# Patient Record
Sex: FEMALE | Race: OTHER | HISPANIC OR LATINO
[De-identification: names, ages, dates, MRNs, and addresses within clinical notes are randomized per-mention and may not be internally consistent; named-entity substitution may affect disease eponyms.]

---

## 2018-10-25 PROBLEM — Z00.00 ENCOUNTER FOR PREVENTIVE HEALTH EXAMINATION: Status: ACTIVE | Noted: 2018-10-25

## 2018-11-12 ENCOUNTER — NON-APPOINTMENT (OUTPATIENT)
Age: 78
End: 2018-11-12

## 2018-11-12 ENCOUNTER — APPOINTMENT (OUTPATIENT)
Dept: CARDIOLOGY | Facility: CLINIC | Age: 78
End: 2018-11-12
Payer: MEDICARE

## 2018-11-12 VITALS
WEIGHT: 150 LBS | HEIGHT: 60 IN | DIASTOLIC BLOOD PRESSURE: 81 MMHG | OXYGEN SATURATION: 100 % | BODY MASS INDEX: 29.45 KG/M2 | SYSTOLIC BLOOD PRESSURE: 173 MMHG | HEART RATE: 89 BPM

## 2018-11-12 DIAGNOSIS — R06.09 OTHER FORMS OF DYSPNEA: ICD-10-CM

## 2018-11-12 DIAGNOSIS — R94.31 ABNORMAL ELECTROCARDIOGRAM [ECG] [EKG]: ICD-10-CM

## 2018-11-12 DIAGNOSIS — I10 ESSENTIAL (PRIMARY) HYPERTENSION: ICD-10-CM

## 2018-11-12 PROCEDURE — 99204 OFFICE O/P NEW MOD 45 MIN: CPT

## 2018-11-12 PROCEDURE — 93000 ELECTROCARDIOGRAM COMPLETE: CPT

## 2018-11-12 NOTE — PHYSICAL EXAM
[General Appearance - Well Developed] : well developed [Normal Appearance] : normal appearance [Well Groomed] : well groomed [General Appearance - Well Nourished] : well nourished [No Deformities] : no deformities [General Appearance - In No Acute Distress] : no acute distress [Normal Conjunctiva] : the conjunctiva exhibited no abnormalities [Eyelids - No Xanthelasma] : the eyelids demonstrated no xanthelasmas [Normal Oral Mucosa] : normal oral mucosa [No Oral Pallor] : no oral pallor [No Oral Cyanosis] : no oral cyanosis [Normal Jugular Venous A Waves Present] : normal jugular venous A waves present [Normal Jugular Venous V Waves Present] : normal jugular venous V waves present [No Jugular Venous Ponce A Waves] : no jugular venous ponce A waves [Heart Rate And Rhythm] : heart rate and rhythm were normal [Heart Sounds] : normal S1 and S2 [Murmurs] : no murmurs present [FreeTextEntry1] : possible S3 [Respiration, Rhythm And Depth] : normal respiratory rhythm and effort [Exaggerated Use Of Accessory Muscles For Inspiration] : no accessory muscle use [Auscultation Breath Sounds / Voice Sounds] : lungs were clear to auscultation bilaterally [Abdomen Soft] : soft [Abdomen Tenderness] : non-tender [Abdomen Mass (___ Cm)] : no abdominal mass palpated [Abnormal Walk] : normal gait [Gait - Sufficient For Exercise Testing] : the gait was sufficient for exercise testing [Nail Clubbing] : no clubbing of the fingernails [Cyanosis, Localized] : no localized cyanosis [Petechial Hemorrhages (___cm)] : no petechial hemorrhages [Skin Color & Pigmentation] : normal skin color and pigmentation [] : no rash [No Venous Stasis] : no venous stasis [Skin Lesions] : no skin lesions [No Skin Ulcers] : no skin ulcer [No Xanthoma] : no  xanthoma was observed [Oriented To Time, Place, And Person] : oriented to person, place, and time [Affect] : the affect was normal [Mood] : the mood was normal [No Anxiety] : not feeling anxious

## 2018-11-12 NOTE — HISTORY OF PRESENT ILLNESS
[FreeTextEntry1] : 78 year old woman with hypertension and hyperlipidemia presents with an abnormal EKG.  Recently frequent PVC's were found.  On Holter up to 10% were PVC's.  She has dyspnea on exertion.  She denies chest pain or syncope.  She has difficultly ambulating due to left knee pain.

## 2018-11-12 NOTE — DISCUSSION/SUMMARY
[FreeTextEntry1] : 78 year old woman with very frequent PVC's.  She has dyspnea on exertion and risk factors for CAD.  Will echo and perform a nuclear pharmacologic stress to rule out an ischemic etiology.

## 2018-11-12 NOTE — REASON FOR VISIT
[Abnormal ECG] : an abnormal ECG [Dyspnea] : dyspnea [Hyperlipidemia] : hyperlipidemia [Hypertension] : hypertension

## 2019-09-21 ENCOUNTER — INPATIENT (INPATIENT)
Facility: HOSPITAL | Age: 79
LOS: 4 days | Discharge: HOME | DRG: 437 | End: 2019-09-26
Attending: FAMILY MEDICINE | Admitting: FAMILY MEDICINE
Payer: SELF-PAY

## 2019-09-21 VITALS
RESPIRATION RATE: 18 BRPM | HEART RATE: 99 BPM | SYSTOLIC BLOOD PRESSURE: 117 MMHG | HEIGHT: 60 IN | DIASTOLIC BLOOD PRESSURE: 58 MMHG | OXYGEN SATURATION: 100 % | TEMPERATURE: 98 F | WEIGHT: 138.89 LBS

## 2019-09-21 DIAGNOSIS — R16.0 HEPATOMEGALY, NOT ELSEWHERE CLASSIFIED: ICD-10-CM

## 2019-09-21 LAB
ALBUMIN SERPL ELPH-MCNC: 2.8 G/DL — LOW (ref 3.3–5)
ALP SERPL-CCNC: 280 U/L — HIGH (ref 40–120)
ALT FLD-CCNC: 34 U/L — SIGNIFICANT CHANGE UP (ref 12–78)
ANION GAP SERPL CALC-SCNC: 8 MMOL/L — SIGNIFICANT CHANGE UP (ref 5–17)
APTT BLD: 33.1 SEC — SIGNIFICANT CHANGE UP (ref 27.5–36.3)
AST SERPL-CCNC: 51 U/L — HIGH (ref 15–37)
BASOPHILS # BLD AUTO: 0 K/UL — SIGNIFICANT CHANGE UP (ref 0–0.2)
BASOPHILS NFR BLD AUTO: 0 % — SIGNIFICANT CHANGE UP (ref 0–2)
BILIRUB SERPL-MCNC: 0.5 MG/DL — SIGNIFICANT CHANGE UP (ref 0.2–1.2)
BUN SERPL-MCNC: 22 MG/DL — SIGNIFICANT CHANGE UP (ref 7–23)
CALCIUM SERPL-MCNC: 9.2 MG/DL — SIGNIFICANT CHANGE UP (ref 8.5–10.1)
CHLORIDE SERPL-SCNC: 98 MMOL/L — SIGNIFICANT CHANGE UP (ref 96–108)
CO2 SERPL-SCNC: 26 MMOL/L — SIGNIFICANT CHANGE UP (ref 22–31)
CREAT SERPL-MCNC: 1.24 MG/DL — SIGNIFICANT CHANGE UP (ref 0.5–1.3)
EOSINOPHIL # BLD AUTO: 0 K/UL — SIGNIFICANT CHANGE UP (ref 0–0.5)
EOSINOPHIL NFR BLD AUTO: 0 % — SIGNIFICANT CHANGE UP (ref 0–6)
GLUCOSE SERPL-MCNC: 150 MG/DL — HIGH (ref 70–99)
HCT VFR BLD CALC: 33.2 % — LOW (ref 34.5–45)
HGB BLD-MCNC: 10.8 G/DL — LOW (ref 11.5–15.5)
INR BLD: 1.46 RATIO — HIGH (ref 0.88–1.16)
LACTATE SERPL-SCNC: 1.6 MMOL/L — SIGNIFICANT CHANGE UP (ref 0.7–2)
LIDOCAIN IGE QN: 168 U/L — SIGNIFICANT CHANGE UP (ref 73–393)
LYMPHOCYTES # BLD AUTO: 1.91 K/UL — SIGNIFICANT CHANGE UP (ref 1–3.3)
LYMPHOCYTES # BLD AUTO: 8 % — LOW (ref 13–44)
MCHC RBC-ENTMCNC: 28.8 PG — SIGNIFICANT CHANGE UP (ref 27–34)
MCHC RBC-ENTMCNC: 32.5 GM/DL — SIGNIFICANT CHANGE UP (ref 32–36)
MCV RBC AUTO: 88.5 FL — SIGNIFICANT CHANGE UP (ref 80–100)
MONOCYTES # BLD AUTO: 1.43 K/UL — HIGH (ref 0–0.9)
MONOCYTES NFR BLD AUTO: 6 % — SIGNIFICANT CHANGE UP (ref 2–14)
NEUTROPHILS # BLD AUTO: 20.25 K/UL — HIGH (ref 1.8–7.4)
NEUTROPHILS NFR BLD AUTO: 85 % — HIGH (ref 43–77)
NRBC # BLD: SIGNIFICANT CHANGE UP /100 WBCS (ref 0–0)
PLATELET # BLD AUTO: 375 K/UL — SIGNIFICANT CHANGE UP (ref 150–400)
POTASSIUM SERPL-MCNC: 4 MMOL/L — SIGNIFICANT CHANGE UP (ref 3.5–5.3)
POTASSIUM SERPL-SCNC: 4 MMOL/L — SIGNIFICANT CHANGE UP (ref 3.5–5.3)
PROT SERPL-MCNC: 8 GM/DL — SIGNIFICANT CHANGE UP (ref 6–8.3)
PROTHROM AB SERPL-ACNC: 16.4 SEC — HIGH (ref 10–12.9)
RBC # BLD: 3.75 M/UL — LOW (ref 3.8–5.2)
RBC # FLD: 13.1 % — SIGNIFICANT CHANGE UP (ref 10.3–14.5)
SODIUM SERPL-SCNC: 132 MMOL/L — LOW (ref 135–145)
WBC # BLD: 23.82 K/UL — HIGH (ref 3.8–10.5)
WBC # FLD AUTO: 23.82 K/UL — HIGH (ref 3.8–10.5)

## 2019-09-21 PROCEDURE — 71260 CT THORAX DX C+: CPT

## 2019-09-21 PROCEDURE — 83605 ASSAY OF LACTIC ACID: CPT

## 2019-09-21 PROCEDURE — 36561 INSERT TUNNELED CV CATH: CPT

## 2019-09-21 PROCEDURE — 36415 COLL VENOUS BLD VENIPUNCTURE: CPT

## 2019-09-21 PROCEDURE — 88342 IMHCHEM/IMCYTCHM 1ST ANTB: CPT

## 2019-09-21 PROCEDURE — C1889: CPT

## 2019-09-21 PROCEDURE — 80053 COMPREHEN METABOLIC PANEL: CPT

## 2019-09-21 PROCEDURE — 88341 IMHCHEM/IMCYTCHM EA ADD ANTB: CPT

## 2019-09-21 PROCEDURE — 76942 ECHO GUIDE FOR BIOPSY: CPT

## 2019-09-21 PROCEDURE — 74183 MRI ABD W/O CNTR FLWD CNTR: CPT

## 2019-09-21 PROCEDURE — 99223 1ST HOSP IP/OBS HIGH 75: CPT

## 2019-09-21 PROCEDURE — 82378 CARCINOEMBRYONIC ANTIGEN: CPT

## 2019-09-21 PROCEDURE — 87040 BLOOD CULTURE FOR BACTERIA: CPT

## 2019-09-21 PROCEDURE — 82105 ALPHA-FETOPROTEIN SERUM: CPT

## 2019-09-21 PROCEDURE — C9113: CPT

## 2019-09-21 PROCEDURE — 88172 CYTP DX EVAL FNA 1ST EA SITE: CPT

## 2019-09-21 PROCEDURE — C1769: CPT

## 2019-09-21 PROCEDURE — 80048 BASIC METABOLIC PNL TOTAL CA: CPT

## 2019-09-21 PROCEDURE — 76937 US GUIDE VASCULAR ACCESS: CPT

## 2019-09-21 PROCEDURE — 93010 ELECTROCARDIOGRAM REPORT: CPT

## 2019-09-21 PROCEDURE — 88333 PATH CONSLTJ SURG CYTO XM 1: CPT

## 2019-09-21 PROCEDURE — 86301 IMMUNOASSAY TUMOR CA 19-9: CPT

## 2019-09-21 PROCEDURE — 88307 TISSUE EXAM BY PATHOLOGIST: CPT

## 2019-09-21 PROCEDURE — 85025 COMPLETE CBC W/AUTO DIFF WBC: CPT

## 2019-09-21 PROCEDURE — 47000 NEEDLE BIOPSY OF LIVER PERQ: CPT

## 2019-09-21 PROCEDURE — 74177 CT ABD & PELVIS W/CONTRAST: CPT | Mod: 26

## 2019-09-21 PROCEDURE — A9579: CPT

## 2019-09-21 PROCEDURE — 77001 FLUOROGUIDE FOR VEIN DEVICE: CPT

## 2019-09-21 PROCEDURE — 71045 X-RAY EXAM CHEST 1 VIEW: CPT | Mod: 26

## 2019-09-21 PROCEDURE — C1894: CPT

## 2019-09-21 RX ORDER — HYDROMORPHONE HYDROCHLORIDE 2 MG/ML
0.2 INJECTION INTRAMUSCULAR; INTRAVENOUS; SUBCUTANEOUS EVERY 4 HOURS
Refills: 0 | Status: DISCONTINUED | OUTPATIENT
Start: 2019-09-21 | End: 2019-09-21

## 2019-09-21 RX ORDER — PANTOPRAZOLE SODIUM 20 MG/1
40 TABLET, DELAYED RELEASE ORAL DAILY
Refills: 0 | Status: DISCONTINUED | OUTPATIENT
Start: 2019-09-21 | End: 2019-09-26

## 2019-09-21 RX ORDER — SODIUM CHLORIDE 9 MG/ML
1000 INJECTION INTRAMUSCULAR; INTRAVENOUS; SUBCUTANEOUS ONCE
Refills: 0 | Status: COMPLETED | OUTPATIENT
Start: 2019-09-21 | End: 2019-09-21

## 2019-09-21 RX ORDER — ENOXAPARIN SODIUM 100 MG/ML
40 INJECTION SUBCUTANEOUS DAILY
Refills: 0 | Status: DISCONTINUED | OUTPATIENT
Start: 2019-09-21 | End: 2019-09-23

## 2019-09-21 RX ORDER — DONEPEZIL HYDROCHLORIDE 10 MG/1
5 TABLET, FILM COATED ORAL AT BEDTIME
Refills: 0 | Status: DISCONTINUED | OUTPATIENT
Start: 2019-09-21 | End: 2019-09-21

## 2019-09-21 RX ORDER — ONDANSETRON 8 MG/1
4 TABLET, FILM COATED ORAL EVERY 8 HOURS
Refills: 0 | Status: DISCONTINUED | OUTPATIENT
Start: 2019-09-21 | End: 2019-09-26

## 2019-09-21 RX ORDER — LISINOPRIL 2.5 MG/1
10 TABLET ORAL DAILY
Refills: 0 | Status: DISCONTINUED | OUTPATIENT
Start: 2019-09-21 | End: 2019-09-21

## 2019-09-21 RX ORDER — SODIUM CHLORIDE 9 MG/ML
1000 INJECTION, SOLUTION INTRAVENOUS
Refills: 0 | Status: DISCONTINUED | OUTPATIENT
Start: 2019-09-21 | End: 2019-09-22

## 2019-09-21 RX ORDER — PIPERACILLIN AND TAZOBACTAM 4; .5 G/20ML; G/20ML
3.38 INJECTION, POWDER, LYOPHILIZED, FOR SOLUTION INTRAVENOUS EVERY 8 HOURS
Refills: 0 | Status: DISCONTINUED | OUTPATIENT
Start: 2019-09-21 | End: 2019-09-25

## 2019-09-21 RX ORDER — HYDROMORPHONE HYDROCHLORIDE 2 MG/ML
0.5 INJECTION INTRAMUSCULAR; INTRAVENOUS; SUBCUTANEOUS EVERY 6 HOURS
Refills: 0 | Status: DISCONTINUED | OUTPATIENT
Start: 2019-09-21 | End: 2019-09-22

## 2019-09-21 RX ADMIN — HYDROMORPHONE HYDROCHLORIDE 0.5 MILLIGRAM(S): 2 INJECTION INTRAMUSCULAR; INTRAVENOUS; SUBCUTANEOUS at 15:43

## 2019-09-21 RX ADMIN — PIPERACILLIN AND TAZOBACTAM 25 GRAM(S): 4; .5 INJECTION, POWDER, LYOPHILIZED, FOR SOLUTION INTRAVENOUS at 21:56

## 2019-09-21 RX ADMIN — SODIUM CHLORIDE 1000 MILLILITER(S): 9 INJECTION INTRAMUSCULAR; INTRAVENOUS; SUBCUTANEOUS at 12:11

## 2019-09-21 RX ADMIN — HYDROMORPHONE HYDROCHLORIDE 0.5 MILLIGRAM(S): 2 INJECTION INTRAMUSCULAR; INTRAVENOUS; SUBCUTANEOUS at 15:14

## 2019-09-21 RX ADMIN — SODIUM CHLORIDE 1000 MILLILITER(S): 9 INJECTION INTRAMUSCULAR; INTRAVENOUS; SUBCUTANEOUS at 13:44

## 2019-09-21 RX ADMIN — SODIUM CHLORIDE 70 MILLILITER(S): 9 INJECTION, SOLUTION INTRAVENOUS at 16:57

## 2019-09-21 RX ADMIN — ENOXAPARIN SODIUM 40 MILLIGRAM(S): 100 INJECTION SUBCUTANEOUS at 21:55

## 2019-09-21 RX ADMIN — SODIUM CHLORIDE 1000 MILLILITER(S): 9 INJECTION INTRAMUSCULAR; INTRAVENOUS; SUBCUTANEOUS at 14:44

## 2019-09-21 RX ADMIN — PIPERACILLIN AND TAZOBACTAM 25 GRAM(S): 4; .5 INJECTION, POWDER, LYOPHILIZED, FOR SOLUTION INTRAVENOUS at 15:24

## 2019-09-21 RX ADMIN — SODIUM CHLORIDE 1000 MILLILITER(S): 9 INJECTION INTRAMUSCULAR; INTRAVENOUS; SUBCUTANEOUS at 13:11

## 2019-09-21 NOTE — H&P ADULT - NSHPPHYSICALEXAM_GEN_ALL_CORE
PHYSICAL EXAM:    Daily Height in cm: 152.4 (21 Sep 2019 11:20)    Daily     ICU Vital Signs Last 24 Hrs  T(C): 36.4 (21 Sep 2019 13:43), Max: 36.9 (21 Sep 2019 11:20)  T(F): 97.6 (21 Sep 2019 13:43), Max: 98.4 (21 Sep 2019 11:20)  HR: 88 (21 Sep 2019 13:43) (88 - 99)  BP: 130/57 (21 Sep 2019 13:43) (117/58 - 130/57)  BP(mean): --  ABP: --  ABP(mean): --  RR: 16 (21 Sep 2019 13:43) (16 - 18)  SpO2: 98% (21 Sep 2019 13:43) (98% - 100%)      Constitutional: NAD  HEENT: Atraumatic, AARON, Normal, No congestion  Respiratory: Breath Sounds normal, no rhonchi/wheeze  Cardiovascular: N S1S2;   Gastrointestinal: Abdomen soft, epigastric tenedrness, no rebound  Extremities: No edema,   Neurological: AAO x 3, no gross focal motor deficits  Breasts: Deferred  Genitourinary: deferred  Rectal: Deferred

## 2019-09-21 NOTE — H&P ADULT - ASSESSMENT
78 y/o F HTN, chronic B/L knee pain, hyperlipidemia, vit D , gall bladder stone , hepatic cyst , small to moderate hemmorrhoids/diverticulosis/tortuous twisted colon/colonoscopy 10/2014, GERD/gastritis/polypoid mucosa. Polypoid lesion with central ulcer/HP gastritis resolved EGD 9/2014, post menopausal hysterectomy 1997, osteopenia, NAFLD,  Patient was sent to ED by her gastroenterologist Dr Yeung for evaluation of abdominal pain /epigastric region constant for 2 weeks .Described as burning .associated with nausea ,  loss of appetite and weight loss 10 lbs in past 2 weeks , bloating . 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 other primary source  # leukocytosis acute cholecystitis vs reactive from GB malignancy , Meets SIRS criteria  #hx NAFL  #hx gastritis/polyp with central ulcer  # hx GB stone, hepatic cyst/diverticulosis  # hx HTN/HLD    Plan  IV zosyn  IV pantacid 40 daily  Keep NPO  IVF  Blood cx, serum lactate  ID consult, GI consult, surgery consult  pt received IVF in ED as per sepsis protocol 30cc/kg   Lovenox SC 40daily for DVT prophylaxis   pt is full code   advanced care planning time spent 20 mins

## 2019-09-21 NOTE — ED STATDOCS - OBJECTIVE STATEMENT
78 y/o F with PMHx of HTN, HLD, Gallstones, Hemorrhoids diverticulosis presenting to the ED c/o abd x 2 weeks. +nausea Patient reports that the pain has been constant for the past 2 weeks, and was seen by doctor today and told her to come to the ER for further evaluation. Patient has US done, and has pancreatic mass in her stomach.  Denies any D/V, fever or chills. Patient has no further complaints at this time.

## 2019-09-21 NOTE — CONSULT NOTE ADULT - SUBJECTIVE AND OBJECTIVE BOX
Surgery called to evaluate this 80 y/o F HTN, chronic B/L knee pain, hyperlipidemia, vit D , gall bladder stone , hepatic cyst , small to moderate hemmorrhoids/diverticulosis/tortuou, GERD/gastritis/polypoid mucosa. Polypoid lesion with central ulcer/HP gastritis resolved EGD 2014, post menopausal hysterectomy , osteopenia, NAFLD,    Patient was sent to ED by her gastroenterologist Dr Yeung for evaluation of abdominal pain /epigastric region constant for 2 weeks .Described as burning .associated with nausea ,  loss of appetite and weight loss 10 lbs in past 2 weeks , bloating . 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 Polypoid lesion with central ulcer/HP gastritis resolved EGD 2014, post menopausal hysterectomy and oophorectomy  , osteopenia, NAFLD,    family hx- mother  of heart attack and stroke   no family hx of colon CA and gastric  CA    social hx - denies etoh use, non smoker, denies recreational drug use       ICU Vital Signs Last 24 Hrs  T(C): 36.8 (21 Sep 2019 16:40), Max: 37 (21 Sep 2019 15:24)  T(F): 98.2 (21 Sep 2019 16:40), Max: 98.6 (21 Sep 2019 15:24)  HR: 94 (21 Sep 2019 16:40) (88 - 99)  BP: 129/45 (21 Sep 2019 16:40) (117/58 - 131/61)  BP(mean): --  ABP: --  ABP(mean): --  RR: 17 (21 Sep 2019 16:40) (15 - 18)  SpO2: 97% (21 Sep 2019 16:40) (96% - 100%)        gen aaox3 nad  cardiac s1 s2 rr  lungs clear  abd soft mass palpable in RUQ, non tender, no signs of peritonitis  ext no edema b/l                          10.8   23.82 )-----------( 375      ( 21 Sep 2019 12:07 )             33.2       132<L>  |  98  |  22  ----------------------------<  150<H>  4.0   |  26  |  1.24    Ca    9.2      21 Sep 2019 12:07    TPro  8.0  /  Alb  2.8<L>  /  TBili  0.5  /  DBili  x   /  AST  51<H>  /  ALT  34  /  AlkPhos  280<H>      < from: CT Abdomen and Pelvis w/ IV Cont (19 @ 13:22) >    EXAM:  CT ABDOMEN AND PELVIS IC                            PROCEDURE DATE:  2019          INTERPRETATION:  CLINICAL INFORMATION: Abdominal pain, masses    COMPARISON: None.    PROCEDURE:   CT of the Abdomen and Pelvis was performed with intravenous contrast.   Intravenous contrast: 90 ml Omnipaque 350. 10 ml discarded.  Oral contrast: None.  Sagittal and coronal reformats were performed.    FINDINGS:    LOWER CHEST: Within normal limits.    LIVER: The large mass involving the right lobe of the liver primarily   segment 5. This measures at at these 7 x 8 x 11 cm. The adjacent   gallbladder is abnormal in appearance. Findings may represent gallbladder   malignancy with extension into the liver, however other sources of   malignancy are not excluded. Numerous small satellite lesions are also   seen throughout the liver.  BILE DUCTS: Normal caliber.  GALLBLADDER: Abnormal, see above under liver.  SPLEEN: Within normal limits.  PANCREAS: Pancreatic head is obscured. The body and tail are unremarkable.  ADRENALS: Within normal limits.  KIDNEYS/URETERS: Subcentimeter right cortical hypodensity too small to   characterize. The kidneys are otherwise unremarkable    BLADDER: Within normal limits.  REPRODUCTIVE ORGANS: Hysterectomy.    BOWEL: Scattered colonic diverticulosis. No bowel obstruction. Appendix   normal  PERITONEUM: Moderate amount of simple free fluid in the pelvis.  VESSELS: Within normal limits.  RETROPERITONEUM/LYMPH NODES: 4 x 5 cm mass at 4-5. This likely represents  a large lymph node. Other enlarged periportal nodes are present.    ABDOMINAL WALL: Within normal limits.  BONES: Within normal limits.    IMPRESSION:     Large mass involving the right lobe of the liver primarily segment 5.   This measures at  least 7 x 8 x 11 cm. The adjacent gallbladder is   abnormal in appearance. Findings may represent gallbladder malignancy   with extension into the liver, however other sources of malignancy are   not excluded. Numerous small satellite lesions are also seen throughout   the liver. Large periportal adenopathy.                    LUIS ALBERTO M.D., ATTENDING RADIOLOGIST  This document has been electronically signed. Sep 21 201    < end of copied text >

## 2019-09-21 NOTE — H&P ADULT - NSHPLABSRESULTS_GEN_ALL_CORE
10.8   23.82 )-----------( 375      ( 21 Sep 2019 12:07 )             33.2       CBC Full  -  ( 21 Sep 2019 12:07 )  WBC Count : 23.82 K/uL  RBC Count : 3.75 M/uL  Hemoglobin : 10.8 g/dL  Hematocrit : 33.2 %  Platelet Count - Automated : 375 K/uL  Mean Cell Volume : 88.5 fl  Mean Cell Hemoglobin : 28.8 pg  Mean Cell Hemoglobin Concentration : 32.5 gm/dL  Auto Neutrophil # : 20.25 K/uL  Auto Lymphocyte # : 1.91 K/uL  Auto Monocyte # : 1.43 K/uL  Auto Eosinophil # : 0.00 K/uL  Auto Basophil # : 0.00 K/uL  Auto Neutrophil % : 85.0 %  Auto Lymphocyte % : 8.0 %  Auto Monocyte % : 6.0 %  Auto Eosinophil % : 0.0 %  Auto Basophil % : 0.0 %      09-21    132<L>  |  98  |  22  ----------------------------<  150<H>  4.0   |  26  |  1.24    Ca    9.2      21 Sep 2019 12:07    TPro  8.0  /  Alb  2.8<L>  /  TBili  0.5  /  DBili  x   /  AST  51<H>  /  ALT  34  /  AlkPhos  280<H>  09-21      LIVER FUNCTIONS - ( 21 Sep 2019 12:07 )  Alb: 2.8 g/dL / Pro: 8.0 gm/dL / ALK PHOS: 280 U/L / ALT: 34 U/L / AST: 51 U/L / GGT: x             PT/INR - ( 21 Sep 2019 12:07 )   PT: 16.4 sec;   INR: 1.46 ratio         PTT - ( 21 Sep 2019 12:07 )  PTT:33.1 sec                  MEDICATIONS  (STANDING):  dextrose 5% + sodium chloride 0.9%. 1000 milliLiter(s) (70 mL/Hr) IV Continuous <Continuous>  piperacillin/tazobactam IVPB.. 3.375 Gram(s) IV Intermittent every 8 hours

## 2019-09-21 NOTE — CONSULT NOTE ADULT - ASSESSMENT
79F with large liver lesion on CT scan with unknown primary      -tumor markers CA 19-9, CEA, AFP  -NPO  -IVF   -will need bx of liver lesion this week  -pain control  -GI/Oncology follow up    d/w Dr Shah 79F with large liver lesion on CT scan with unknown primary      -tumor markers CA 19-9, CEA,  -will need CT CHEST FOR STAGING  -NPO  -IVF   -will need bx of liver lesion this week  -pain control  -GI/Oncology follow up    d/w Dr Shah

## 2019-09-21 NOTE — ED STATDOCS - PROGRESS NOTE DETAILS
Opal NAVARRO for ED attending, Dr. Camarena: 80 y/o F with PMHx of HTN, HLD, Gallstones, Hemorrhoids diverticulosis presenting to the ED c/o abd x 2 weeks. +nausea Patient reports that the pain has been constant for the past 2 weeks, and was seen by doctor today and told her to come to the ER for further evaluation.  Denies any D/V, fever or chills. Patient has no further complaints at this time. Patient will be sent to main for further evaluation. Pt. sent in by Dr. Yeung for pancreatic mass/Liver mass.  Pt. sent in by Dr. Yeung.  Kathryn Fitch PA-C Dr. Berger requested 30cc/kg secondary to leukocytosis.  IVF added.  Kathryn Fitch PA-C 78 y/o F with PMHx of HTN, HLD, Gallstones, Hemorrhoids diverticulosis presenting to the ED c/o abd x 2 weeks. +nausea Patient reports that the pain has been constant for the past 2 weeks, and was seen by doctor today and told her to come to the ER for further evaluation. Patient has US done, and has pancreatic mass in her stomach.  Denies any D/V, fever or chills. Patient has no further complaints at this time.  Kathryn Fitch PA-C

## 2019-09-21 NOTE — H&P ADULT - HISTORY OF PRESENT ILLNESS
80 y/o F HTN, chronic B/L knee pain, hyperlipidemia, vit D , gall bladder stone , hepatic cyst , small to moderate hemmorrhoids/diverticulosis/tortuous twisted colon/colonoscopy 10/2014, GERD/gastritis/polypoid mucosa. Polypoid lesion with central ulcer/HP gastritis resolved EGD 2014, post menopausal hysterectomy , osteopenia, NAFLD,  Patient was sent to ED by her gastroenterologist Dr Yeung for evaluation of abdominal pain /epigastric region constant for 2 weeks .Described as burning .associated with nausea ,  loss of appetite and weight loss 10 lbs in past 2 weeks , bloating . Has 1 Bowel movement every day with straining  Abd US done at dr yeung's office revealed pancreatic mass 4x3.8x3.8cm with retrolymphadenopathy , large liver mass 8x5.6cm suspected for liver or pancreatic malignancy     in ED WBC 23,000, HR 99, afebrile, alkphos 280, AST 51, lipase 186    Pmhx/Pshx- F HTN, chronic B/L knee pain, hyperlipidemia, vit D , gall bladder stone , hepatic cyst , small to moderate hemmorrhoids/diverticulosis/tortuous twisted colon/colonoscopy 10/2014, GERD/gastritis/polypoid mucosa. Polypoid lesion with central ulcer/HP gastritis resolved EGD 2014, post menopausal hysterectomy and oophorectomy  , osteopenia, NAFLD,    family hx- mother  of heart attack and stroke   no family hx of colon CA and gastric  CA    social hx 78 y/o F HTN, chronic B/L knee pain, hyperlipidemia, vit D , gall bladder stone , hepatic cyst , small to moderate hemmorrhoids/diverticulosis/tortuous twisted colon/colonoscopy 10/2014, GERD/gastritis/polypoid mucosa. Polypoid lesion with central ulcer/HP gastritis resolved EGD 2014, post menopausal hysterectomy , osteopenia, NAFLD,  Patient was sent to ED by her gastroenterologist Dr Yeung for evaluation of abdominal pain /epigastric region constant for 2 weeks .Described as burning .associated with nausea ,  loss of appetite and weight loss 10 lbs in past 2 weeks , bloating . 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 mets to liver , obscurred pancreatic head     Pmhx/Pshx- F HTN, chronic B/L knee pain, hyperlipidemia, vit D , gall bladder stone , hepatic cyst , small to moderate hemmorrhoids/diverticulosis/tortuous twisted colon/colonoscopy 10/2014, GERD/gastritis/polypoid mucosa. Polypoid lesion with central ulcer/HP gastritis resolved EGD 2014, post menopausal hysterectomy and oophorectomy  , osteopenia, NAFLD,    family hx- mother  of heart attack and stroke   no family hx of colon CA and gastric  CA    social hx - denies etoh use, non smoker, denies recreational drug use

## 2019-09-21 NOTE — PATIENT PROFILE ADULT - NSPROEDALEARNPREFOTH_GEN_A_NUR
computer/internet/skill demonstration/verbal instruction/video/individual instruction/written material

## 2019-09-21 NOTE — ED STATDOCS - ATTENDING CONTRIBUTION TO CARE
I, Amandeep Camarena, performed the initial face to face bedside interview with this patient regarding history of present illness, review of symptoms and relevant past medical, social and family history.  I completed an independent physical examination.  I was the initial provider who evaluated this patient. I have signed out the follow up of any pending tests (i.e. labs, radiological studies) to the ACP.  I have communicated the patient’s plan of care and disposition with the ACP.  The history, relevant review of systems, past medical and surgical history, medical decision making, and physical examination was documented by the scribe in my presence and I attest to the accuracy of the documentation.

## 2019-09-22 LAB
ALBUMIN SERPL ELPH-MCNC: 2.1 G/DL — LOW (ref 3.3–5)
ALP SERPL-CCNC: 253 U/L — HIGH (ref 40–120)
ALT FLD-CCNC: 27 U/L — SIGNIFICANT CHANGE UP (ref 12–78)
ANION GAP SERPL CALC-SCNC: 10 MMOL/L — SIGNIFICANT CHANGE UP (ref 5–17)
AST SERPL-CCNC: 26 U/L — SIGNIFICANT CHANGE UP (ref 15–37)
BASOPHILS # BLD AUTO: 0.04 K/UL — SIGNIFICANT CHANGE UP (ref 0–0.2)
BASOPHILS NFR BLD AUTO: 0.2 % — SIGNIFICANT CHANGE UP (ref 0–2)
BILIRUB SERPL-MCNC: 0.4 MG/DL — SIGNIFICANT CHANGE UP (ref 0.2–1.2)
BUN SERPL-MCNC: 12 MG/DL — SIGNIFICANT CHANGE UP (ref 7–23)
CALCIUM SERPL-MCNC: 8.5 MG/DL — SIGNIFICANT CHANGE UP (ref 8.5–10.1)
CHLORIDE SERPL-SCNC: 107 MMOL/L — SIGNIFICANT CHANGE UP (ref 96–108)
CO2 SERPL-SCNC: 22 MMOL/L — SIGNIFICANT CHANGE UP (ref 22–31)
CREAT SERPL-MCNC: 0.78 MG/DL — SIGNIFICANT CHANGE UP (ref 0.5–1.3)
EOSINOPHIL # BLD AUTO: 0.31 K/UL — SIGNIFICANT CHANGE UP (ref 0–0.5)
EOSINOPHIL NFR BLD AUTO: 1.6 % — SIGNIFICANT CHANGE UP (ref 0–6)
GLUCOSE SERPL-MCNC: 134 MG/DL — HIGH (ref 70–99)
HCT VFR BLD CALC: 28.4 % — LOW (ref 34.5–45)
HGB BLD-MCNC: 9.1 G/DL — LOW (ref 11.5–15.5)
IMM GRANULOCYTES NFR BLD AUTO: 0.6 % — SIGNIFICANT CHANGE UP (ref 0–1.5)
LYMPHOCYTES # BLD AUTO: 1.1 K/UL — SIGNIFICANT CHANGE UP (ref 1–3.3)
LYMPHOCYTES # BLD AUTO: 5.6 % — LOW (ref 13–44)
MCHC RBC-ENTMCNC: 28.6 PG — SIGNIFICANT CHANGE UP (ref 27–34)
MCHC RBC-ENTMCNC: 32 GM/DL — SIGNIFICANT CHANGE UP (ref 32–36)
MCV RBC AUTO: 89.3 FL — SIGNIFICANT CHANGE UP (ref 80–100)
MONOCYTES # BLD AUTO: 1.47 K/UL — HIGH (ref 0–0.9)
MONOCYTES NFR BLD AUTO: 7.4 % — SIGNIFICANT CHANGE UP (ref 2–14)
NEUTROPHILS # BLD AUTO: 16.72 K/UL — HIGH (ref 1.8–7.4)
NEUTROPHILS NFR BLD AUTO: 84.6 % — HIGH (ref 43–77)
PLATELET # BLD AUTO: 302 K/UL — SIGNIFICANT CHANGE UP (ref 150–400)
POTASSIUM SERPL-MCNC: 3.8 MMOL/L — SIGNIFICANT CHANGE UP (ref 3.5–5.3)
POTASSIUM SERPL-SCNC: 3.8 MMOL/L — SIGNIFICANT CHANGE UP (ref 3.5–5.3)
PROT SERPL-MCNC: 6.3 GM/DL — SIGNIFICANT CHANGE UP (ref 6–8.3)
RBC # BLD: 3.18 M/UL — LOW (ref 3.8–5.2)
RBC # FLD: 13.2 % — SIGNIFICANT CHANGE UP (ref 10.3–14.5)
SODIUM SERPL-SCNC: 139 MMOL/L — SIGNIFICANT CHANGE UP (ref 135–145)
WBC # BLD: 19.76 K/UL — HIGH (ref 3.8–10.5)
WBC # FLD AUTO: 19.76 K/UL — HIGH (ref 3.8–10.5)

## 2019-09-22 RX ORDER — SODIUM CHLORIDE 9 MG/ML
1000 INJECTION INTRAMUSCULAR; INTRAVENOUS; SUBCUTANEOUS
Refills: 0 | Status: DISCONTINUED | OUTPATIENT
Start: 2019-09-22 | End: 2019-09-25

## 2019-09-22 RX ORDER — OXYCODONE HYDROCHLORIDE 5 MG/1
5 TABLET ORAL EVERY 6 HOURS
Refills: 0 | Status: DISCONTINUED | OUTPATIENT
Start: 2019-09-22 | End: 2019-09-26

## 2019-09-22 RX ORDER — HYDROMORPHONE HYDROCHLORIDE 2 MG/ML
0.5 INJECTION INTRAMUSCULAR; INTRAVENOUS; SUBCUTANEOUS ONCE
Refills: 0 | Status: DISCONTINUED | OUTPATIENT
Start: 2019-09-22 | End: 2019-09-22

## 2019-09-22 RX ORDER — LISINOPRIL 2.5 MG/1
10 TABLET ORAL DAILY
Refills: 0 | Status: DISCONTINUED | OUTPATIENT
Start: 2019-09-22 | End: 2019-09-26

## 2019-09-22 RX ORDER — HYDROMORPHONE HYDROCHLORIDE 2 MG/ML
0.5 INJECTION INTRAMUSCULAR; INTRAVENOUS; SUBCUTANEOUS EVERY 4 HOURS
Refills: 0 | Status: DISCONTINUED | OUTPATIENT
Start: 2019-09-22 | End: 2019-09-26

## 2019-09-22 RX ADMIN — ENOXAPARIN SODIUM 40 MILLIGRAM(S): 100 INJECTION SUBCUTANEOUS at 13:54

## 2019-09-22 RX ADMIN — HYDROMORPHONE HYDROCHLORIDE 0.5 MILLIGRAM(S): 2 INJECTION INTRAMUSCULAR; INTRAVENOUS; SUBCUTANEOUS at 05:42

## 2019-09-22 RX ADMIN — PIPERACILLIN AND TAZOBACTAM 25 GRAM(S): 4; .5 INJECTION, POWDER, LYOPHILIZED, FOR SOLUTION INTRAVENOUS at 21:00

## 2019-09-22 RX ADMIN — OXYCODONE HYDROCHLORIDE 5 MILLIGRAM(S): 5 TABLET ORAL at 20:21

## 2019-09-22 RX ADMIN — HYDROMORPHONE HYDROCHLORIDE 0.5 MILLIGRAM(S): 2 INJECTION INTRAMUSCULAR; INTRAVENOUS; SUBCUTANEOUS at 11:03

## 2019-09-22 RX ADMIN — OXYCODONE HYDROCHLORIDE 5 MILLIGRAM(S): 5 TABLET ORAL at 21:30

## 2019-09-22 RX ADMIN — OXYCODONE HYDROCHLORIDE 5 MILLIGRAM(S): 5 TABLET ORAL at 16:57

## 2019-09-22 RX ADMIN — PANTOPRAZOLE SODIUM 40 MILLIGRAM(S): 20 TABLET, DELAYED RELEASE ORAL at 13:54

## 2019-09-22 RX ADMIN — HYDROMORPHONE HYDROCHLORIDE 0.5 MILLIGRAM(S): 2 INJECTION INTRAMUSCULAR; INTRAVENOUS; SUBCUTANEOUS at 13:55

## 2019-09-22 RX ADMIN — SODIUM CHLORIDE 50 MILLILITER(S): 9 INJECTION INTRAMUSCULAR; INTRAVENOUS; SUBCUTANEOUS at 13:53

## 2019-09-22 RX ADMIN — SODIUM CHLORIDE 70 MILLILITER(S): 9 INJECTION, SOLUTION INTRAVENOUS at 06:21

## 2019-09-22 RX ADMIN — PIPERACILLIN AND TAZOBACTAM 25 GRAM(S): 4; .5 INJECTION, POWDER, LYOPHILIZED, FOR SOLUTION INTRAVENOUS at 05:55

## 2019-09-22 RX ADMIN — OXYCODONE HYDROCHLORIDE 5 MILLIGRAM(S): 5 TABLET ORAL at 13:54

## 2019-09-22 RX ADMIN — PIPERACILLIN AND TAZOBACTAM 25 GRAM(S): 4; .5 INJECTION, POWDER, LYOPHILIZED, FOR SOLUTION INTRAVENOUS at 13:54

## 2019-09-22 NOTE — CONSULT NOTE ADULT - SUBJECTIVE AND OBJECTIVE BOX
Patient is a 79y old  Female who presents with a chief complaint of abdominal pain x2 weeks , nausea , sent to ED (22 Sep 2019 08:32)    HPI:  78 y/o F HTN, chronic B/L knee pain, hyperlipidemia, hepatic cyst , small to moderate hemmorrhoids/diverticulosis/tortuous twisted colon/colonoscopy 10/2014, GERD,  s/p  hysterectomy 1997, osteopenia, NAFLD, admitted on 9/21 for evaluation of 2 weeks history of right upper and mid quadrant abdominal pain, associated with nausea and bloating ;has had weight loss and outpatient imaging suspicious for large liver mass. Denies fever but did have some chills.           PMH: as above  PSH: as above  Meds: per reconciliation sheet, noted below  MEDICATIONS  (STANDING):  dextrose 5% + sodium chloride 0.9%. 1000 milliLiter(s) (70 mL/Hr) IV Continuous <Continuous>  enoxaparin Injectable 40 milliGRAM(s) SubCutaneous daily  pantoprazole  Injectable 40 milliGRAM(s) IV Push daily  piperacillin/tazobactam IVPB.. 3.375 Gram(s) IV Intermittent every 8 hours    MEDICATIONS  (PRN):  enalaprilat Injectable 0.625 milliGRAM(s) IV Push every 6 hours PRN for SBP>150  HYDROmorphone  Injectable 0.5 milliGRAM(s) IV Push every 6 hours PRN Moderate Pain (4 - 6)  ondansetron Injectable 4 milliGRAM(s) IV Push every 8 hours PRN Nausea and/or Vomiting    Allergies    No Known Allergies    Intolerances      Social: no smoking, no alcohol, no illegal drugs; no recent travel, no exposure to TB  FAMILY HISTORY:       ROS: the patient denies fever, no HA, no dizziness, no sore throat, no blurry vision, no CP, no palpitations, no SOB, no cough, no diarrhea, no dysuria, no leg pain, no claudication, no rash, no joint aches, no rectal pain or bleeding, no night sweats  All other systems reviewed and are negative    Vital Signs Last 24 Hrs  T(C): 36.9 (22 Sep 2019 05:30), Max: 37 (21 Sep 2019 15:24)  T(F): 98.4 (22 Sep 2019 05:30), Max: 98.6 (21 Sep 2019 15:24)  HR: 83 (22 Sep 2019 05:30) (83 - 99)  BP: 127/59 (22 Sep 2019 05:30) (117/58 - 131/61)  BP(mean): --  RR: 17 (22 Sep 2019 05:30) (15 - 18)  SpO2: 96% (22 Sep 2019 05:30) (96% - 100%)  Daily Height in cm: 154.94 (21 Sep 2019 16:40)    Daily     PE:    Constitutional: frail looking  HEENT: NC/AT, EOMI, PERRLA, conjunctivae clear; ears and nose atraumatic; pharynx clear  Neck: supple; thyroid not palpable  Back: no tenderness  Respiratory: respiratory effort normal; clear to auscultation  Cardiovascular: S1S2 regular, no murmurs  Abdomen: soft, right upper and mid quadrant tenderness; not distended, positive BS; no liver or spleen organomegaly  Genitourinary: no suprapubic tenderness  Musculoskeletal: no muscle tenderness, no joint swelling or tenderness  Neurological/ Psychiatric: AxOx3, judgement and insight normal;  moving all extremities  Skin: no rashes; no palpable lesions    Labs: all available labs reviewed                        9.1    19.76 )-----------( 302      ( 22 Sep 2019 07:06 )             28.4     09-22    139  |  107  |  12  ----------------------------<  134<H>  3.8   |  22  |  0.78    Ca    8.5      22 Sep 2019 07:06    TPro  6.3  /  Alb  2.1<L>  /  TBili  0.4  /  DBili  x   /  AST  26  /  ALT  27  /  AlkPhos  253<H>  09-22     LIVER FUNCTIONS - ( 22 Sep 2019 07:06 )  Alb: 2.1 g/dL / Pro: 6.3 gm/dL / ALK PHOS: 253 U/L / ALT: 27 U/L / AST: 26 U/L / GGT: x           < from: CT Abdomen and Pelvis w/ IV Cont (09.21.19 @ 13:22) >    EXAM:  CT ABDOMEN AND PELVIS IC                            PROCEDURE DATE:  09/21/2019          INTERPRETATION:  CLINICAL INFORMATION: Abdominal pain, masses    COMPARISON: None.    PROCEDURE:   CT of the Abdomen and Pelvis was performed with intravenous contrast.   Intravenous contrast: 90 ml Omnipaque 350. 10 ml discarded.  Oral contrast: None.  Sagittal and coronal reformats were performed.    FINDINGS:    LOWER CHEST: Within normal limits.    LIVER: The large mass involving the right lobe of the liver primarily   segment 5. This measures at at these 7 x 8 x 11 cm. The adjacent   gallbladder is abnormal in appearance. Findings may represent gallbladder   malignancy with extension into the liver, however other sources of   malignancy are not excluded. Numerous small satellite lesions are also   seen throughout the liver.  BILE DUCTS: Normal caliber.  GALLBLADDER: Abnormal, see above under liver.  SPLEEN: Within normal limits.  PANCREAS: Pancreatic head is obscured. The body and tail are unremarkable.  ADRENALS: Within normal limits.  KIDNEYS/URETERS: Subcentimeter right cortical hypodensity too small to   characterize. The kidneys are otherwise unremarkable    BLADDER: Within normal limits.  REPRODUCTIVE ORGANS: Hysterectomy.    BOWEL: Scattered colonic diverticulosis. No bowel obstruction. Appendix   normal  PERITONEUM: Moderate amount of simple free fluid in the pelvis.  VESSELS: Within normal limits.  RETROPERITONEUM/LYMPH NODES: 4 x 5 cm mass at 4-5. This likely represents  a large lymph node. Other enlarged periportal nodes are present.    ABDOMINAL WALL: Within normal limits.  BONES: Within normal limits.    IMPRESSION:     Large mass involving the right lobe of the liver primarily segment 5.   This measures at  least 7 x 8 x 11 cm. The adjacent gallbladder is   abnormal in appearance. Findings may represent gallbladder malignancy   with extension into the liver, however other sources of malignancy are   not excluded. Numerous small satellite lesions are also seen throughout   the liver. Large periportal adenopathy.            < end of copied text >        Radiology: all available radiological tests reviewed    Advanced directives addressed: full resuscitation

## 2019-09-22 NOTE — CONSULT NOTE ADULT - ASSESSMENT
Large Liver mass  ?source Gall bladder CA( MAbnormal appearance of GB  REC  CT guided Liver biopsy  Sr AFP

## 2019-09-22 NOTE — CONSULT NOTE ADULT - ASSESSMENT
78 y/o F HTN, chronic B/L knee pain, hyperlipidemia, hepatic cyst , small to moderate hemmorrhoids/diverticulosis/tortuous twisted colon/colonoscopy 10/2014, GERD,  s/p  hysterectomy 1997, osteopenia, NAFLD, admitted on 9/21 for evaluation of 2 weeks history of right upper and mid quadrant abdominal pain, associated with nausea and bloating ;has had weight loss and outpatient imaging suspicious for large liver mass. Denies fever but did have some chills.   1. Patient admitted with large liver mass, questionable gall bladder lesion as well, there is concern about possible cholangitis?   - follow up cultures   - iv hydration and supportive care   - serial cbc and monitor temperature   - reviewed prior medical records to evaluate for resistant or atypical pathogens   - the patient needs biopsy to evaluate tissue  - will continue zosyn for now, as bacterial translocation in this highly vascular area is possible  2. other issues: per medicine

## 2019-09-22 NOTE — PROGRESS NOTE ADULT - ASSESSMENT
78 y/o F HTN, chronic B/L knee pain, hyperlipidemia, vit D , gall bladder stone , hepatic cyst , small to moderate hemmorrhoids/diverticulosis/tortuous twisted colon/colonoscopy 10/2014, GERD/gastritis/polypoid mucosa. Polypoid lesion with central ulcer/HP gastritis resolved EGD 9/2014, post menopausal hysterectomy 1997, osteopenia, NAFLD,  Patient was sent to ED by her gastroenterologist Dr Yeung for evaluation of abdominal pain /epigastric region constant for 2 weeks .Described as burning .associated with nausea ,  loss of appetite and weight loss 10 lbs in past 2 weeks , bloating . 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 other primary source  # leukocytosis acute cholecystitis vs reactive from GB malignancy , Mets SIRS criteria  #hx NAFL  #hx gastritis/polyp with central ulcer  # hx GB stone, hepatic cyst/diverticulosis  # hx HTN/HLD    Plan  IV zosyn  IV pantacid 40 daily  would consider trial of clears, surg consult appreciated   IVF  Blood cx, serum lactate  ID consult, GI consult, surgery consult, hemonc  pt received IVF in ED as per sepsis protocol 30cc/kg   Lovenox SC 40daily for DVT prophylaxis   pt is full code   advanced care planning time spent 20 mins

## 2019-09-22 NOTE — CONSULT NOTE ADULT - SUBJECTIVE AND OBJECTIVE BOX
HPI:  80 y/o F HTN, chronic B/L knee pain, hyperlipidemia, vit D , gall bladder stone , hepatic cyst , small to moderate hemmorrhoids/diverticulosis/tortuous twisted colon/colonoscopy 10/2014, GERD/gastritis/polypoid mucosa. Polypoid lesion with central ulcer/HP gastritis resolved EGD 2014, post menopausal hysterectomy , osteopenia, NAFLD,  Patient was sent to ED by her gastroenterologist Dr Yeung for evaluation of abdominal pain /epigastric region constant for 2 weeks .Described as burning .associated with nausea ,  loss of appetite and weight loss 10 lbs in past 2 weeks , bloating . 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 mets to liver , obscurred pancreatic head     Pmhx/Pshx- F HTN, chronic B/L knee pain, hyperlipidemia, vit D , gall bladder stone , hepatic cyst , small to moderate hemmorrhoids/diverticulosis/tortuous twisted colon/colonoscopy 10/2014, GERD/gastritis/polypoid mucosa. Polypoid lesion with central ulcer/HP gastritis resolved EGD 2014, post menopausal hysterectomy and oophorectomy  , osteopenia, NAFLD,    family hx- mother  of heart attack and stroke   no family hx of colon CA and gastric  CA    social hx - denies etoh use, non smoker, denies recreational drug use (21 Sep 2019 13:30)      PAST MEDICAL & SURGICAL HISTORY:      MEDICATIONS  (STANDING):  dextrose 5% + sodium chloride 0.9%. 1000 milliLiter(s) (70 mL/Hr) IV Continuous <Continuous>  enoxaparin Injectable 40 milliGRAM(s) SubCutaneous daily  pantoprazole  Injectable 40 milliGRAM(s) IV Push daily  piperacillin/tazobactam IVPB.. 3.375 Gram(s) IV Intermittent every 8 hours    MEDICATIONS  (PRN):  enalaprilat Injectable 0.625 milliGRAM(s) IV Push every 6 hours PRN for SBP>150  HYDROmorphone  Injectable 0.5 milliGRAM(s) IV Push every 4 hours PRN Severe Pain (7 - 10)  ondansetron Injectable 4 milliGRAM(s) IV Push every 8 hours PRN Nausea and/or Vomiting      Allergies    No Known Allergies    Intolerances        SOCIAL HISTORY:    FAMILY HISTORY:   Non-contributory    REVIEW OF SYSTEMS      General:	    Respiratory and Thorax:  	  Cardiovascular:	    Gastrointestinal:	    Musculoskeletal:	   Vital Signs Last 24 Hrs  T(C): 36.9 (22 Sep 2019 11:44), Max: 37 (21 Sep 2019 15:24)  T(F): 98.5 (22 Sep 2019 11:44), Max: 98.6 (21 Sep 2019 15:24)  HR: 87 (22 Sep 2019 11:44) (83 - 94)  BP: 103/55 (22 Sep 2019 11:44) (103/55 - 131/61)  BP(mean): --  RR: 16 (22 Sep 2019 11:44) (15 - 17)  SpO2: 94% (22 Sep 2019 11:44) (94% - 98%)    HEENT :No Pallor.No icterus. EOMI,PERLAA  Chest : Clear to Auscultation  CVS : S1S2 Normal.No murmurs.  Abdomen: Soft.Non tender .Normal bowel sounds.No Organomegaly.  CNS: Alert.Oriented to Time,Place and Person.No focal deficit.  EXT: Normal Range of motion.No pitting edema.    LABS:                        9.1    19.76 )-----------( 302      ( 22 Sep 2019 07:06 )             28.4         139  |  107  |  12  ----------------------------<  134<H>  3.8   |  22  |  0.78    Ca    8.5      22 Sep 2019 07:06    TPro  6.3  /  Alb  2.1<L>  /  TBili  0.4  /  DBili  x   /  AST  26  /  ALT  27  /  AlkPhos  253<H>      PT/INR - ( 21 Sep 2019 12:07 )   PT: 16.4 sec;   INR: 1.46 ratio         PTT - ( 21 Sep 2019 12:07 )  PTT:33.1 sec  LIVER FUNCTIONS - ( 22 Sep 2019 07:06 )  Alb: 2.1 g/dL / Pro: 6.3 gm/dL / ALK PHOS: 253 U/L / ALT: 27 U/L / AST: 26 U/L / GGT: x             RADIOLOGY & ADDITIONAL STUDIES:

## 2019-09-23 ENCOUNTER — RESULT REVIEW (OUTPATIENT)
Age: 79
End: 2019-09-23

## 2019-09-23 LAB
ALBUMIN SERPL ELPH-MCNC: 2.1 G/DL — LOW (ref 3.3–5)
ALP SERPL-CCNC: 322 U/L — HIGH (ref 40–120)
ALT FLD-CCNC: 31 U/L — SIGNIFICANT CHANGE UP (ref 12–78)
ANION GAP SERPL CALC-SCNC: 10 MMOL/L — SIGNIFICANT CHANGE UP (ref 5–17)
AST SERPL-CCNC: 44 U/L — HIGH (ref 15–37)
BASOPHILS # BLD AUTO: 0.06 K/UL — SIGNIFICANT CHANGE UP (ref 0–0.2)
BASOPHILS NFR BLD AUTO: 0.4 % — SIGNIFICANT CHANGE UP (ref 0–2)
BILIRUB SERPL-MCNC: 0.4 MG/DL — SIGNIFICANT CHANGE UP (ref 0.2–1.2)
BUN SERPL-MCNC: 15 MG/DL — SIGNIFICANT CHANGE UP (ref 7–23)
CALCIUM SERPL-MCNC: 8.3 MG/DL — LOW (ref 8.5–10.1)
CHLORIDE SERPL-SCNC: 106 MMOL/L — SIGNIFICANT CHANGE UP (ref 96–108)
CO2 SERPL-SCNC: 22 MMOL/L — SIGNIFICANT CHANGE UP (ref 22–31)
CREAT SERPL-MCNC: 0.74 MG/DL — SIGNIFICANT CHANGE UP (ref 0.5–1.3)
EOSINOPHIL # BLD AUTO: 0.48 K/UL — SIGNIFICANT CHANGE UP (ref 0–0.5)
EOSINOPHIL NFR BLD AUTO: 3.1 % — SIGNIFICANT CHANGE UP (ref 0–6)
GLUCOSE SERPL-MCNC: 104 MG/DL — HIGH (ref 70–99)
HCT VFR BLD CALC: 27.6 % — LOW (ref 34.5–45)
HGB BLD-MCNC: 8.8 G/DL — LOW (ref 11.5–15.5)
IMM GRANULOCYTES NFR BLD AUTO: 0.6 % — SIGNIFICANT CHANGE UP (ref 0–1.5)
LYMPHOCYTES # BLD AUTO: 1.3 K/UL — SIGNIFICANT CHANGE UP (ref 1–3.3)
LYMPHOCYTES # BLD AUTO: 8.5 % — LOW (ref 13–44)
MCHC RBC-ENTMCNC: 29.1 PG — SIGNIFICANT CHANGE UP (ref 27–34)
MCHC RBC-ENTMCNC: 31.9 GM/DL — LOW (ref 32–36)
MCV RBC AUTO: 91.4 FL — SIGNIFICANT CHANGE UP (ref 80–100)
MONOCYTES # BLD AUTO: 1.21 K/UL — HIGH (ref 0–0.9)
MONOCYTES NFR BLD AUTO: 7.9 % — SIGNIFICANT CHANGE UP (ref 2–14)
NEUTROPHILS # BLD AUTO: 12.12 K/UL — HIGH (ref 1.8–7.4)
NEUTROPHILS NFR BLD AUTO: 79.5 % — HIGH (ref 43–77)
PLATELET # BLD AUTO: 276 K/UL — SIGNIFICANT CHANGE UP (ref 150–400)
POTASSIUM SERPL-MCNC: 3.8 MMOL/L — SIGNIFICANT CHANGE UP (ref 3.5–5.3)
POTASSIUM SERPL-SCNC: 3.8 MMOL/L — SIGNIFICANT CHANGE UP (ref 3.5–5.3)
PROT SERPL-MCNC: 6.5 GM/DL — SIGNIFICANT CHANGE UP (ref 6–8.3)
RBC # BLD: 3.02 M/UL — LOW (ref 3.8–5.2)
RBC # FLD: 13.5 % — SIGNIFICANT CHANGE UP (ref 10.3–14.5)
SODIUM SERPL-SCNC: 138 MMOL/L — SIGNIFICANT CHANGE UP (ref 135–145)
WBC # BLD: 15.26 K/UL — HIGH (ref 3.8–10.5)
WBC # FLD AUTO: 15.26 K/UL — HIGH (ref 3.8–10.5)

## 2019-09-23 PROCEDURE — 88341 IMHCHEM/IMCYTCHM EA ADD ANTB: CPT | Mod: 26

## 2019-09-23 PROCEDURE — 88307 TISSUE EXAM BY PATHOLOGIST: CPT | Mod: 26

## 2019-09-23 PROCEDURE — 76942 ECHO GUIDE FOR BIOPSY: CPT | Mod: 26

## 2019-09-23 PROCEDURE — 99232 SBSQ HOSP IP/OBS MODERATE 35: CPT

## 2019-09-23 PROCEDURE — 88342 IMHCHEM/IMCYTCHM 1ST ANTB: CPT | Mod: 26

## 2019-09-23 PROCEDURE — 47000 NEEDLE BIOPSY OF LIVER PERQ: CPT

## 2019-09-23 PROCEDURE — 88333 PATH CONSLTJ SURG CYTO XM 1: CPT | Mod: 26

## 2019-09-23 RX ORDER — SODIUM CHLORIDE 9 MG/ML
1000 INJECTION INTRAMUSCULAR; INTRAVENOUS; SUBCUTANEOUS
Refills: 0 | Status: DISCONTINUED | OUTPATIENT
Start: 2019-09-23 | End: 2019-09-25

## 2019-09-23 RX ORDER — FENTANYL CITRATE 50 UG/ML
50 INJECTION INTRAVENOUS
Refills: 0 | Status: DISCONTINUED | OUTPATIENT
Start: 2019-09-23 | End: 2019-09-23

## 2019-09-23 RX ORDER — SODIUM CHLORIDE 9 MG/ML
1000 INJECTION, SOLUTION INTRAVENOUS
Refills: 0 | Status: DISCONTINUED | OUTPATIENT
Start: 2019-09-23 | End: 2019-09-23

## 2019-09-23 RX ADMIN — OXYCODONE HYDROCHLORIDE 5 MILLIGRAM(S): 5 TABLET ORAL at 20:40

## 2019-09-23 RX ADMIN — PIPERACILLIN AND TAZOBACTAM 25 GRAM(S): 4; .5 INJECTION, POWDER, LYOPHILIZED, FOR SOLUTION INTRAVENOUS at 05:19

## 2019-09-23 RX ADMIN — FENTANYL CITRATE 50 MICROGRAM(S): 50 INJECTION INTRAVENOUS at 13:21

## 2019-09-23 RX ADMIN — PIPERACILLIN AND TAZOBACTAM 25 GRAM(S): 4; .5 INJECTION, POWDER, LYOPHILIZED, FOR SOLUTION INTRAVENOUS at 22:06

## 2019-09-23 RX ADMIN — PIPERACILLIN AND TAZOBACTAM 25 GRAM(S): 4; .5 INJECTION, POWDER, LYOPHILIZED, FOR SOLUTION INTRAVENOUS at 15:47

## 2019-09-23 RX ADMIN — OXYCODONE HYDROCHLORIDE 5 MILLIGRAM(S): 5 TABLET ORAL at 05:09

## 2019-09-23 RX ADMIN — OXYCODONE HYDROCHLORIDE 5 MILLIGRAM(S): 5 TABLET ORAL at 19:39

## 2019-09-23 NOTE — BRIEF OPERATIVE NOTE - OPERATION/FINDINGS
1) Diffuse involvement of medial R lobe  2) Access under US with 17G guide  3) 18G core x 5, adequate  4) D-stat tract embolization

## 2019-09-23 NOTE — PROGRESS NOTE ADULT - ASSESSMENT
80 y/o F HTN, chronic B/L knee pain, hyperlipidemia, vit D , gall bladder stone , hepatic cyst , small to moderate hemmorrhoids/diverticulosis/tortuous twisted colon/colonoscopy 10/2014, GERD/gastritis/polypoid mucosa. Polypoid lesion with central ulcer/HP gastritis resolved EGD 9/2014, post menopausal hysterectomy 1997, osteopenia, NAFLD,  Patient was sent to ED by her gastroenterologist Dr Yeung for evaluation of abdominal pain /epigastric region constant for 2 weeks .Described as burning .associated with nausea ,  loss of appetite and weight loss 10 lbs in past 2 weeks , bloating . 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 other primary source  # leukocytosis acute cholecystitis vs reactive from GB malignancy , Mets SIRS criteria  #hx NAFL  #hx gastritis/polyp with central ulcer  # hx GB stone, hepatic cyst/diverticulosis  # hx HTN/HLD    Plan  Ct guided bx today   hemonc consult pending   IV zosyn  IV pantacid 40 daily  full liquid  IVF  Blood cx, serum lactateID consult, GI consult, surgery consult, hemonc  pt received IVF in ED as per sepsis protocol 30cc/kg   Lovenox SC 40daily for DVT prophylaxis   pt is full code   advanced care planning time spent 20 mins

## 2019-09-23 NOTE — PROGRESS NOTE ADULT - ASSESSMENT
80 y/o F HTN, chronic B/L knee pain, hyperlipidemia, hepatic cyst , small to moderate hemmorrhoids/diverticulosis/tortuous twisted colon/colonoscopy 10/2014, GERD,  s/p  hysterectomy 1997, osteopenia, NAFLD, admitted on 9/21 for evaluation of 2 weeks history of right upper and mid quadrant abdominal pain, associated with nausea and bloating ;has had weight loss and outpatient imaging suspicious for large liver mass. Denies fever but did have some chills.   1. Patient admitted with large liver mass, questionable gall bladder lesion as well, there is concern about possible cholangitis?   - follow up cultures   - iv hydration and supportive care   - serial cbc and monitor temperature   - reviewed prior medical records to evaluate for resistant or atypical pathogens   - the patient needs biopsy to evaluate tissue  - will continue zosyn for now, as bacterial translocation in this highly vascular area is possible, day #2  - tolerating antibiotics without rashes or side effects   2. other issues: per medicine

## 2019-09-24 LAB
ALBUMIN SERPL ELPH-MCNC: 2.3 G/DL — LOW (ref 3.3–5)
ALP SERPL-CCNC: 532 U/L — HIGH (ref 40–120)
ALT FLD-CCNC: 58 U/L — SIGNIFICANT CHANGE UP (ref 12–78)
ANION GAP SERPL CALC-SCNC: 8 MMOL/L — SIGNIFICANT CHANGE UP (ref 5–17)
AST SERPL-CCNC: 101 U/L — HIGH (ref 15–37)
BASOPHILS # BLD AUTO: 0.06 K/UL — SIGNIFICANT CHANGE UP (ref 0–0.2)
BASOPHILS NFR BLD AUTO: 0.3 % — SIGNIFICANT CHANGE UP (ref 0–2)
BILIRUB SERPL-MCNC: 0.9 MG/DL — SIGNIFICANT CHANGE UP (ref 0.2–1.2)
BUN SERPL-MCNC: 16 MG/DL — SIGNIFICANT CHANGE UP (ref 7–23)
CALCIUM SERPL-MCNC: 8.9 MG/DL — SIGNIFICANT CHANGE UP (ref 8.5–10.1)
CHLORIDE SERPL-SCNC: 106 MMOL/L — SIGNIFICANT CHANGE UP (ref 96–108)
CO2 SERPL-SCNC: 24 MMOL/L — SIGNIFICANT CHANGE UP (ref 22–31)
CREAT SERPL-MCNC: 0.79 MG/DL — SIGNIFICANT CHANGE UP (ref 0.5–1.3)
EOSINOPHIL # BLD AUTO: 0.37 K/UL — SIGNIFICANT CHANGE UP (ref 0–0.5)
EOSINOPHIL NFR BLD AUTO: 2.1 % — SIGNIFICANT CHANGE UP (ref 0–6)
GLUCOSE SERPL-MCNC: 118 MG/DL — HIGH (ref 70–99)
HCT VFR BLD CALC: 29.6 % — LOW (ref 34.5–45)
HGB BLD-MCNC: 9.3 G/DL — LOW (ref 11.5–15.5)
IMM GRANULOCYTES NFR BLD AUTO: 0.9 % — SIGNIFICANT CHANGE UP (ref 0–1.5)
LYMPHOCYTES # BLD AUTO: 1.3 K/UL — SIGNIFICANT CHANGE UP (ref 1–3.3)
LYMPHOCYTES # BLD AUTO: 7.2 % — LOW (ref 13–44)
MCHC RBC-ENTMCNC: 28.6 PG — SIGNIFICANT CHANGE UP (ref 27–34)
MCHC RBC-ENTMCNC: 31.4 GM/DL — LOW (ref 32–36)
MCV RBC AUTO: 91.1 FL — SIGNIFICANT CHANGE UP (ref 80–100)
MONOCYTES # BLD AUTO: 1.35 K/UL — HIGH (ref 0–0.9)
MONOCYTES NFR BLD AUTO: 7.5 % — SIGNIFICANT CHANGE UP (ref 2–14)
NEUTROPHILS # BLD AUTO: 14.69 K/UL — HIGH (ref 1.8–7.4)
NEUTROPHILS NFR BLD AUTO: 82 % — HIGH (ref 43–77)
PLATELET # BLD AUTO: 262 K/UL — SIGNIFICANT CHANGE UP (ref 150–400)
POTASSIUM SERPL-MCNC: 3.9 MMOL/L — SIGNIFICANT CHANGE UP (ref 3.5–5.3)
POTASSIUM SERPL-SCNC: 3.9 MMOL/L — SIGNIFICANT CHANGE UP (ref 3.5–5.3)
PROT SERPL-MCNC: 7 GM/DL — SIGNIFICANT CHANGE UP (ref 6–8.3)
RBC # BLD: 3.25 M/UL — LOW (ref 3.8–5.2)
RBC # FLD: 13.5 % — SIGNIFICANT CHANGE UP (ref 10.3–14.5)
SODIUM SERPL-SCNC: 138 MMOL/L — SIGNIFICANT CHANGE UP (ref 135–145)
WBC # BLD: 17.94 K/UL — HIGH (ref 3.8–10.5)
WBC # FLD AUTO: 17.94 K/UL — HIGH (ref 3.8–10.5)

## 2019-09-24 PROCEDURE — 71260 CT THORAX DX C+: CPT | Mod: 26

## 2019-09-24 RX ORDER — SODIUM CHLORIDE 9 MG/ML
1000 INJECTION INTRAMUSCULAR; INTRAVENOUS; SUBCUTANEOUS
Refills: 0 | Status: COMPLETED | OUTPATIENT
Start: 2019-09-24 | End: 2019-09-24

## 2019-09-24 RX ORDER — DOCUSATE SODIUM 100 MG
100 CAPSULE ORAL ONCE
Refills: 0 | Status: COMPLETED | OUTPATIENT
Start: 2019-09-24 | End: 2019-09-24

## 2019-09-24 RX ORDER — ENOXAPARIN SODIUM 100 MG/ML
40 INJECTION SUBCUTANEOUS DAILY
Refills: 0 | Status: DISCONTINUED | OUTPATIENT
Start: 2019-09-24 | End: 2019-09-24

## 2019-09-24 RX ORDER — SENNA PLUS 8.6 MG/1
2 TABLET ORAL ONCE
Refills: 0 | Status: COMPLETED | OUTPATIENT
Start: 2019-09-24 | End: 2019-09-24

## 2019-09-24 RX ADMIN — OXYCODONE HYDROCHLORIDE 5 MILLIGRAM(S): 5 TABLET ORAL at 23:18

## 2019-09-24 RX ADMIN — HYDROMORPHONE HYDROCHLORIDE 0.5 MILLIGRAM(S): 2 INJECTION INTRAMUSCULAR; INTRAVENOUS; SUBCUTANEOUS at 00:09

## 2019-09-24 RX ADMIN — LISINOPRIL 10 MILLIGRAM(S): 2.5 TABLET ORAL at 11:40

## 2019-09-24 RX ADMIN — OXYCODONE HYDROCHLORIDE 5 MILLIGRAM(S): 5 TABLET ORAL at 04:51

## 2019-09-24 RX ADMIN — PIPERACILLIN AND TAZOBACTAM 25 GRAM(S): 4; .5 INJECTION, POWDER, LYOPHILIZED, FOR SOLUTION INTRAVENOUS at 22:27

## 2019-09-24 RX ADMIN — SENNA PLUS 2 TABLET(S): 8.6 TABLET ORAL at 22:26

## 2019-09-24 RX ADMIN — ENOXAPARIN SODIUM 40 MILLIGRAM(S): 100 INJECTION SUBCUTANEOUS at 11:40

## 2019-09-24 RX ADMIN — OXYCODONE HYDROCHLORIDE 5 MILLIGRAM(S): 5 TABLET ORAL at 03:51

## 2019-09-24 RX ADMIN — OXYCODONE HYDROCHLORIDE 5 MILLIGRAM(S): 5 TABLET ORAL at 22:26

## 2019-09-24 RX ADMIN — PIPERACILLIN AND TAZOBACTAM 25 GRAM(S): 4; .5 INJECTION, POWDER, LYOPHILIZED, FOR SOLUTION INTRAVENOUS at 05:47

## 2019-09-24 RX ADMIN — Medication 100 MILLIGRAM(S): at 22:26

## 2019-09-24 RX ADMIN — PANTOPRAZOLE SODIUM 40 MILLIGRAM(S): 20 TABLET, DELAYED RELEASE ORAL at 11:40

## 2019-09-24 RX ADMIN — PIPERACILLIN AND TAZOBACTAM 25 GRAM(S): 4; .5 INJECTION, POWDER, LYOPHILIZED, FOR SOLUTION INTRAVENOUS at 14:24

## 2019-09-24 RX ADMIN — HYDROMORPHONE HYDROCHLORIDE 0.5 MILLIGRAM(S): 2 INJECTION INTRAMUSCULAR; INTRAVENOUS; SUBCUTANEOUS at 00:25

## 2019-09-24 NOTE — CONSULT NOTE ADULT - REASON FOR ADMISSION
abdominal pain x2 weeks , nausea , sent to ED

## 2019-09-24 NOTE — PROGRESS NOTE ADULT - ASSESSMENT
78 y/o F HTN, chronic B/L knee pain, hyperlipidemia, vit D , gall bladder stone , hepatic cyst , small to moderate hemmorrhoids/diverticulosis/tortuous twisted colon/colonoscopy 10/2014, GERD/gastritis/polypoid mucosa. Polypoid lesion with central ulcer/HP gastritis resolved EGD 9/2014, post menopausal hysterectomy 1997, osteopenia, NAFLD,  Patient was sent to ED by her gastroenterologist Dr Yeung for evaluation of abdominal pain /epigastric region constant for 2 weeks .Described as burning .associated with nausea ,  loss of appetite and weight loss 10 lbs in past 2 weeks , bloating . 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 other primary source  # leukocytosis likely  reactive from GB malignancy ,  but high risk for GI translocationMets SIRS criteria  #hx NAFL  #hx gastritis/polyp with central ulcer  # hx GB stone, hepatic cyst/diverticulosis  # hx HTN/HLD    Plan  pending bx result, awaiting hemonc recommendations , will discuss with Gi whether pt would need to be transferred to another facility to futher manage this  IVF due to repeat IV contrast  will order CT chest with con for CA staging    s/p Ct guided liver  bx 9/23- result pending  hemonc consult pending   IV zosyn  IV pantacid 40 daily  regular diet  IVF  Blood cx, serum lactateID consult, GI consult, surgery consult, hemonc  pt received IVF in ED as per sepsis protocol 30cc/kg   Lovenox SC 40daily for DVT prophylaxis   pt is full code   advanced care planning time spent 20 mins

## 2019-09-24 NOTE — CONSULT NOTE ADULT - SUBJECTIVE AND OBJECTIVE BOX
History as per chart, and extensive discussion with patient's brother at bedside.  79-year-old female from [facility], with several weeks of epigastric pain which progressively worsened ,nausea, anorexia, 10 pound weight loss, and bloating. no fevers sweats  diarrhea or vomiting  Saw outpatient gastroenterologist Dr. Yeung,, abdominal ultrasound revealed "pancreatic mass", and referred to [facility] for admission.    Past medical history hypertension, hypercholesterolemia, hemorrhoids, diverticulosis, tortuous twisty colon, GERD, gastritis, osteopenia.  Past surgical history hysterectomy/oophorectomy 1997  Social: , lives with her brother Ivan Reyes.  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, no toxic exposures    Family history not should be no family history of gastro- intestinal malignancies  Physical examination well-developed 79-year-old  female, appears comfortable in bed  HEENT normocephalic anicteric, EOM I  Neck supple, no lymph nodes  Lungs clear  Heart S1-S2  Breast without palpable masses  Abdomen soft midepigastric tenderness with palpation bowel sounds present  Extremities without pitting edema  Lymph nodes none palpable neck axillary or inguinal areas  Neurologic moves all extremities well    < from: CT Abdomen and Pelvis w/ IV Cont (09.21.19 @ 13:22) >    EXAM:  CT ABDOMEN AND PELVIS IC                            PROCEDURE DATE:  09/21/2019          INTERPRETATION:  CLINICAL INFORMATION: Abdominal pain, masses    COMPARISON: None.    PROCEDURE:   CT of the Abdomen and Pelvis was performed with intravenous contrast.   Intravenous contrast: 90 ml Omnipaque 350. 10 ml discarded.  Oral contrast: None.  Sagittal and coronal reformats were performed.    FINDINGS:    LOWER CHEST: Within normal limits.    LIVER: The large mass involving the right lobe of the liver primarily   segment 5. This measures at at these 7 x 8 x 11 cm. The adjacent   gallbladder is abnormal in appearance. Findings may represent gallbladder   malignancy with extension into the liver, however other sources of   malignancy are not excluded. Numerous small satellite lesions are also   seen throughout the liver.  BILE DUCTS: Normal caliber.  GALLBLADDER: Abnormal, see above under liver.  SPLEEN: Within normal limits.  PANCREAS: Pancreatic head is obscured. The body and tail are unremarkable.  ADRENALS: Within normal limits.  KIDNEYS/URETERS: Subcentimeter right cortical hypodensity too small to   characterize. The kidneys are otherwise unremarkable    BLADDER: Within normal limits.  REPRODUCTIVE ORGANS: Hysterectomy.    BOWEL: Scattered colonic diverticulosis. No bowel obstruction. Appendix   normal  PERITONEUM: Moderate amount of simple free fluid in the pelvis.  VESSELS: Within normal limits.  RETROPERITONEUM/LYMPH NODES: 4 x 5 cm mass at 4-5. This likely represents  a large lymph node. Other enlarged periportal nodes are present.    ABDOMINAL WALL: Within normal limits.  BONES: Within normal limits.    IMPRESSION:     Large mass involving the right lobe of the liver primarily segment 5.   This measures at  least 7 x 8 x 11 cm. The adjacent gallbladder is   abnormal in appearance. Findings may represent gallbladder malignancy   with extension into the liver, however other sources of malignancy are   not excluded. Numerous small satellite lesions are also seen throughout   the liver. Large periportal adenopathy.        EXAM:  CT CHEST IC                        PROCEDURE DATE:  09/24/2019    NTERPRETATION:  Chest CT with contrast dated 9/24/2019.    COMPARISON: None available.    CLINICAL INFORMATION: For staging.  TECHNIQUE: Contiguous axial 2.5 mm slice thickness images of the chest   were obtained after intravenous contrast administration.    FINDINGS:  In the inferior part of the thyroid isthmus is a 2 cm suspicious partly   enhancing nodule,for which  correlation with ultrasound commented.    The airway shows normal caliber and contour with patent lumen.    There are no focal airspace consolidations. There is a 3 mm calcified   nodule in the left upper lobe image 2-for deep. There is a 3 mm left   upper lobe indeterminate nodule image 2-47.There is a 3.2 mm   indeterminate nodule in the inferior right upper lobe on image 2-50.     There is no pleural effusion or pneumothorax.    There are no mediastinal masses or lymphadenopathy.     The mediastinum great vessels are normal.     The heart is normal. There is no pericardial effusion.    A limited evaluation of the upper abdomen again shows extensive liver   lesions and lesions in the gallbladder fossa , with invasion of the right   lobe of the liver, as was described on the earlier CT of the abdomen and   pelvis.    The bones , some degenerative changes in the thoracic spine. There is no   evidence of any destructive bone lesions.    IMPRESSION:   A suspicious enhancing thyroid lesion, for which ultrasound is   recommended for further evaluation if clinically indicated.  No definite evidence of metastases  to the chest.   Some small indeterminate lung nodules.    Comprehensive Metabolic Panel in AM (09.24.19 @ 06:52)    Sodium, Serum: 138 mmol/L    Potassium, Serum: 3.9 mmol/L    Chloride, Serum: 106 mmol/L    Carbon Dioxide, Serum: 24 mmol/L    Anion Gap, Serum: 8 mmol/L    Blood Urea Nitrogen, Serum: 16 mg/dL    Creatinine, Serum: 0.79 mg/dL    Glucose, Serum: 118 mg/dL    Calcium, Total Serum: 8.9 mg/dL    Protein Total, Serum: 7.0 gm/dL    Albumin, Serum: 2.3 g/dL      Bilirubin Total, Serum: 0.9 mg/dL    Alkaline Phosphatase, Serum: 532 U/L    Aspartate Aminotransferase (AST/SGOT): 101 U/L    Alanine Aminotransferase (ALT/SGPT): 58 U/L      Complete Blood Count + Automated Diff in AM (09.24.19 @ 06:52)    WBC Count: 17.94 K/uL    RBC Count: 3.25 M/uL    Hemoglobin: 9.3 g/dL    Hematocrit: 29.6 %    Mean Cell Volume: 91.1 fl    Mean Cell Hemoglobin: 28.6 pg    Mean Cell Hemoglobin Conc: 31.4 gm/dL    Red Cell Distrib Width: 13.5 %    Platelet Count - Automated: 262 K/uL      Alpha Fetoprotein - Tumor Marker: 2.5:  Cancer Antigen, GI Ca 19-9:   12:

## 2019-09-24 NOTE — PROGRESS NOTE ADULT - ASSESSMENT
Liver mass  Obscured pancreatic head on CT  ? pancreatic mass on USG in another facility  REC  Follow up bx results  MRCP with contrast  Pt should ideally be transferred to a tertiary care Ctr for further management

## 2019-09-24 NOTE — CONSULT NOTE ADULT - ASSESSMENT
79-year-old female with multiple mild chronic medical issues  Now with abdominal pain and anorexia nausea loading weight loss  Elevated liver function tests  CT scan abdomen and pelvis with mass near gallbladder into liver as well as numerous liver lesions    Pancreatic tumor marker alpha-fetoprotein both normal    Picture is one of an upper gastrointestinal malignancy, numerous liver metastases suggest diseases not amenable to surgery, and that depending on histology, is most likely incurable  Patient is had a liver biopsy and specific histology will help determine primary, and help define possible palliative care treatment.  I been unequivocal with the patient's brother that the condition is serious, that she would most likely be offered systemic chemotherapy, and based on specific histology treatment options can then determine whether she would like to pursue palliative chemotherapy.  Have suggested placement of a port to facilitate IV access  Follow-up with me next week to review findings pathology and potential palliative care plan.  My business card was given to both the patient and another one to her brother and I explained that it is essential he accompany her to the follow-up visit as she lives with him and he is very involved in her care  All questions answered

## 2019-09-25 LAB
ALBUMIN SERPL ELPH-MCNC: 2 G/DL — LOW (ref 3.3–5)
ALP SERPL-CCNC: 563 U/L — HIGH (ref 40–120)
ALT FLD-CCNC: 58 U/L — SIGNIFICANT CHANGE UP (ref 12–78)
ANION GAP SERPL CALC-SCNC: 12 MMOL/L — SIGNIFICANT CHANGE UP (ref 5–17)
AST SERPL-CCNC: 82 U/L — HIGH (ref 15–37)
BASOPHILS # BLD AUTO: 0.06 K/UL — SIGNIFICANT CHANGE UP (ref 0–0.2)
BASOPHILS NFR BLD AUTO: 0.3 % — SIGNIFICANT CHANGE UP (ref 0–2)
BILIRUB SERPL-MCNC: 0.8 MG/DL — SIGNIFICANT CHANGE UP (ref 0.2–1.2)
BUN SERPL-MCNC: 11 MG/DL — SIGNIFICANT CHANGE UP (ref 7–23)
CALCIUM SERPL-MCNC: 8.3 MG/DL — LOW (ref 8.5–10.1)
CHLORIDE SERPL-SCNC: 106 MMOL/L — SIGNIFICANT CHANGE UP (ref 96–108)
CO2 SERPL-SCNC: 21 MMOL/L — LOW (ref 22–31)
CREAT SERPL-MCNC: 0.61 MG/DL — SIGNIFICANT CHANGE UP (ref 0.5–1.3)
EOSINOPHIL # BLD AUTO: 0.52 K/UL — HIGH (ref 0–0.5)
EOSINOPHIL NFR BLD AUTO: 2.8 % — SIGNIFICANT CHANGE UP (ref 0–6)
GLUCOSE SERPL-MCNC: 119 MG/DL — HIGH (ref 70–99)
HCT VFR BLD CALC: 28.5 % — LOW (ref 34.5–45)
HGB BLD-MCNC: 9.1 G/DL — LOW (ref 11.5–15.5)
IMM GRANULOCYTES NFR BLD AUTO: 0.8 % — SIGNIFICANT CHANGE UP (ref 0–1.5)
LYMPHOCYTES # BLD AUTO: 1.34 K/UL — SIGNIFICANT CHANGE UP (ref 1–3.3)
LYMPHOCYTES # BLD AUTO: 7.3 % — LOW (ref 13–44)
MCHC RBC-ENTMCNC: 28.7 PG — SIGNIFICANT CHANGE UP (ref 27–34)
MCHC RBC-ENTMCNC: 31.9 GM/DL — LOW (ref 32–36)
MCV RBC AUTO: 89.9 FL — SIGNIFICANT CHANGE UP (ref 80–100)
MONOCYTES # BLD AUTO: 1.27 K/UL — HIGH (ref 0–0.9)
MONOCYTES NFR BLD AUTO: 6.9 % — SIGNIFICANT CHANGE UP (ref 2–14)
NEUTROPHILS # BLD AUTO: 15 K/UL — HIGH (ref 1.8–7.4)
NEUTROPHILS NFR BLD AUTO: 81.9 % — HIGH (ref 43–77)
PLATELET # BLD AUTO: 231 K/UL — SIGNIFICANT CHANGE UP (ref 150–400)
POTASSIUM SERPL-MCNC: 3.5 MMOL/L — SIGNIFICANT CHANGE UP (ref 3.5–5.3)
POTASSIUM SERPL-SCNC: 3.5 MMOL/L — SIGNIFICANT CHANGE UP (ref 3.5–5.3)
PROT SERPL-MCNC: 6.6 GM/DL — SIGNIFICANT CHANGE UP (ref 6–8.3)
RBC # BLD: 3.17 M/UL — LOW (ref 3.8–5.2)
RBC # FLD: 13.3 % — SIGNIFICANT CHANGE UP (ref 10.3–14.5)
SODIUM SERPL-SCNC: 139 MMOL/L — SIGNIFICANT CHANGE UP (ref 135–145)
WBC # BLD: 18.33 K/UL — HIGH (ref 3.8–10.5)
WBC # FLD AUTO: 18.33 K/UL — HIGH (ref 3.8–10.5)

## 2019-09-25 PROCEDURE — 74183 MRI ABD W/O CNTR FLWD CNTR: CPT | Mod: 26

## 2019-09-25 RX ORDER — SODIUM CHLORIDE 9 MG/ML
1000 INJECTION, SOLUTION INTRAVENOUS
Refills: 0 | Status: DISCONTINUED | OUTPATIENT
Start: 2019-09-25 | End: 2019-09-26

## 2019-09-25 RX ORDER — SODIUM CHLORIDE 9 MG/ML
1000 INJECTION INTRAMUSCULAR; INTRAVENOUS; SUBCUTANEOUS
Refills: 0 | Status: DISCONTINUED | OUTPATIENT
Start: 2019-09-25 | End: 2019-09-25

## 2019-09-25 RX ADMIN — PANTOPRAZOLE SODIUM 40 MILLIGRAM(S): 20 TABLET, DELAYED RELEASE ORAL at 11:52

## 2019-09-25 RX ADMIN — PIPERACILLIN AND TAZOBACTAM 25 GRAM(S): 4; .5 INJECTION, POWDER, LYOPHILIZED, FOR SOLUTION INTRAVENOUS at 06:31

## 2019-09-25 RX ADMIN — OXYCODONE HYDROCHLORIDE 5 MILLIGRAM(S): 5 TABLET ORAL at 13:55

## 2019-09-25 RX ADMIN — SODIUM CHLORIDE 60 MILLILITER(S): 9 INJECTION INTRAMUSCULAR; INTRAVENOUS; SUBCUTANEOUS at 10:46

## 2019-09-25 RX ADMIN — OXYCODONE HYDROCHLORIDE 5 MILLIGRAM(S): 5 TABLET ORAL at 17:04

## 2019-09-25 RX ADMIN — OXYCODONE HYDROCHLORIDE 5 MILLIGRAM(S): 5 TABLET ORAL at 09:59

## 2019-09-25 RX ADMIN — OXYCODONE HYDROCHLORIDE 5 MILLIGRAM(S): 5 TABLET ORAL at 17:43

## 2019-09-25 NOTE — PROGRESS NOTE ADULT - ASSESSMENT
78 y/o F HTN, chronic B/L knee pain, hyperlipidemia, hepatic cyst , small to moderate hemmorrhoids/diverticulosis/tortuous twisted colon/colonoscopy 10/2014, GERD,  s/p  hysterectomy 1997, osteopenia, NAFLD, admitted on 9/21 for evaluation of 2 weeks history of right upper and mid quadrant abdominal pain, associated with nausea and bloating ;has had weight loss and outpatient imaging suspicious for large liver mass. Denies fever but did have some chills.   1. Patient admitted with large liver mass, questionable gall bladder lesion as well, there is concern about possible cholangitis?   - follow up cultures   - iv hydration and supportive care   - serial cbc and monitor temperature   - reviewed prior medical records to evaluate for resistant or atypical pathogens   - the patient had biopsy; to have MRI  - will stop zosyn today  - If further ID issues please reconsult   2. other issues: per medicine

## 2019-09-25 NOTE — PROGRESS NOTE ADULT - ASSESSMENT
80 yo F with large liver lesion on CT scan with unknown primary.    Plan:  -pain control prn  -c/w IVF   -f/u liver biopsy results  -GI/Oncology follow up  -medical management as per primary team    Plan discussed with Dr. Baird

## 2019-09-25 NOTE — PROGRESS NOTE ADULT - ASSESSMENT
78 y/o F HTN, chronic B/L knee pain, hyperlipidemia, vit D , gall bladder stone , hepatic cyst , small to moderate hemmorrhoids/diverticulosis/tortuous twisted colon/colonoscopy 10/2014, GERD/gastritis/polypoid mucosa. Polypoid lesion with central ulcer/HP gastritis resolved EGD 9/2014, post menopausal hysterectomy 1997, osteopenia, NAFLD,  Patient was sent to ED by her gastroenterologist Dr Yeung for evaluation of abdominal pain /epigastric region constant for 2 weeks .Described as burning .associated with nausea ,  loss of appetite and weight loss 10 lbs in past 2 weeks , bloating . 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 other primary source  # leukocytosis likely  reactive from GB malignancy ,  but high risk for GI translocation Mets SIRS criteria so was transiently on IV zosyn now stopped   #hx NAFL  #hx gastritis/polyp with central ulcer  # hx GB stone, hepatic cyst/diverticulosis  # hx HTN/HLD    Plan  for port a cath by IR in am and then plan for discharge 9/26 to f/u dr beth as outpatient for planning curative vs palliative chemo based on bx results   CT chest done for staging shows thyroid nodule  other wise neg for malignancy   MRCP report  pending  s/p Ct guided liver  bx 9/23- result pending  now off zosyn  ppi  regular diet  IVF if npo for procedure   ID consult, GI consult, surgery consult, hemonc consulst appreciated   pt received IVF in ED as per sepsis protocol 30cc/kg   Lovenox SC 40daily for DVT prophylaxis   pt is full code   advanced care planning time spent 20 mins

## 2019-09-26 ENCOUNTER — TRANSCRIPTION ENCOUNTER (OUTPATIENT)
Age: 79
End: 2019-09-26

## 2019-09-26 VITALS
RESPIRATION RATE: 17 BRPM | SYSTOLIC BLOOD PRESSURE: 133 MMHG | HEART RATE: 91 BPM | TEMPERATURE: 98 F | DIASTOLIC BLOOD PRESSURE: 64 MMHG | OXYGEN SATURATION: 98 %

## 2019-09-26 LAB
ANION GAP SERPL CALC-SCNC: 8 MMOL/L — SIGNIFICANT CHANGE UP (ref 5–17)
BUN SERPL-MCNC: 11 MG/DL — SIGNIFICANT CHANGE UP (ref 7–23)
CALCIUM SERPL-MCNC: 8.7 MG/DL — SIGNIFICANT CHANGE UP (ref 8.5–10.1)
CHLORIDE SERPL-SCNC: 106 MMOL/L — SIGNIFICANT CHANGE UP (ref 96–108)
CO2 SERPL-SCNC: 21 MMOL/L — LOW (ref 22–31)
CREAT SERPL-MCNC: 0.55 MG/DL — SIGNIFICANT CHANGE UP (ref 0.5–1.3)
CULTURE RESULTS: SIGNIFICANT CHANGE UP
CULTURE RESULTS: SIGNIFICANT CHANGE UP
GLUCOSE SERPL-MCNC: 134 MG/DL — HIGH (ref 70–99)
POTASSIUM SERPL-MCNC: 4.2 MMOL/L — SIGNIFICANT CHANGE UP (ref 3.5–5.3)
POTASSIUM SERPL-SCNC: 4.2 MMOL/L — SIGNIFICANT CHANGE UP (ref 3.5–5.3)
SODIUM SERPL-SCNC: 135 MMOL/L — SIGNIFICANT CHANGE UP (ref 135–145)
SPECIMEN SOURCE: SIGNIFICANT CHANGE UP
SPECIMEN SOURCE: SIGNIFICANT CHANGE UP

## 2019-09-26 PROCEDURE — 36561 INSERT TUNNELED CV CATH: CPT

## 2019-09-26 PROCEDURE — 77001 FLUOROGUIDE FOR VEIN DEVICE: CPT | Mod: 26

## 2019-09-26 PROCEDURE — 76937 US GUIDE VASCULAR ACCESS: CPT | Mod: 26

## 2019-09-26 RX ORDER — GEMFIBROZIL 600 MG
1 TABLET ORAL
Qty: 0 | Refills: 0 | DISCHARGE

## 2019-09-26 RX ORDER — OXYCODONE HYDROCHLORIDE 5 MG/1
1 TABLET ORAL
Qty: 28 | Refills: 0
Start: 2019-09-26 | End: 2019-10-02

## 2019-09-26 RX ORDER — PANTOPRAZOLE SODIUM 20 MG/1
1 TABLET, DELAYED RELEASE ORAL
Qty: 30 | Refills: 0
Start: 2019-09-26 | End: 2019-10-25

## 2019-09-26 RX ORDER — ACETAMINOPHEN 500 MG
1000 TABLET ORAL ONCE
Refills: 0 | Status: COMPLETED | OUTPATIENT
Start: 2019-09-26 | End: 2019-09-26

## 2019-09-26 RX ORDER — ONDANSETRON 8 MG/1
4 TABLET, FILM COATED ORAL EVERY 6 HOURS
Refills: 0 | Status: DISCONTINUED | OUTPATIENT
Start: 2019-09-26 | End: 2019-09-26

## 2019-09-26 RX ORDER — OXYCODONE HYDROCHLORIDE 5 MG/1
5 TABLET ORAL ONCE
Refills: 0 | Status: DISCONTINUED | OUTPATIENT
Start: 2019-09-26 | End: 2019-09-26

## 2019-09-26 RX ORDER — SODIUM CHLORIDE 9 MG/ML
1000 INJECTION, SOLUTION INTRAVENOUS
Refills: 0 | Status: DISCONTINUED | OUTPATIENT
Start: 2019-09-26 | End: 2019-09-26

## 2019-09-26 RX ADMIN — HYDROMORPHONE HYDROCHLORIDE 0.5 MILLIGRAM(S): 2 INJECTION INTRAMUSCULAR; INTRAVENOUS; SUBCUTANEOUS at 07:55

## 2019-09-26 RX ADMIN — SODIUM CHLORIDE 60 MILLILITER(S): 9 INJECTION, SOLUTION INTRAVENOUS at 00:40

## 2019-09-26 RX ADMIN — HYDROMORPHONE HYDROCHLORIDE 0.5 MILLIGRAM(S): 2 INJECTION INTRAMUSCULAR; INTRAVENOUS; SUBCUTANEOUS at 00:40

## 2019-09-26 RX ADMIN — Medication 400 MILLIGRAM(S): at 15:51

## 2019-09-26 RX ADMIN — PANTOPRAZOLE SODIUM 40 MILLIGRAM(S): 20 TABLET, DELAYED RELEASE ORAL at 18:46

## 2019-09-26 RX ADMIN — SODIUM CHLORIDE 75 MILLILITER(S): 9 INJECTION, SOLUTION INTRAVENOUS at 15:51

## 2019-09-26 RX ADMIN — HYDROMORPHONE HYDROCHLORIDE 0.5 MILLIGRAM(S): 2 INJECTION INTRAMUSCULAR; INTRAVENOUS; SUBCUTANEOUS at 08:15

## 2019-09-26 RX ADMIN — LISINOPRIL 10 MILLIGRAM(S): 2.5 TABLET ORAL at 18:47

## 2019-09-26 NOTE — DISCHARGE NOTE PROVIDER - CARE PROVIDER_API CALL
Bijan Yeung)  Gastroenterology  [address] [address] [place] [address], [address] [place] [address]  Phone: [phone]  Fax: [phone]  Follow Up Time:     Rick Scott)  Medical Oncology  [address] [facility], [address] [place] [address]  Phone: [phone]  Fax: [phone]  Follow Up Time:

## 2019-09-26 NOTE — BRIEF OPERATIVE NOTE - NSICDXBRIEFPROCEDURE_GEN_ALL_CORE_FT
PROCEDURES:  Insertion of catheter with port 26-Sep-2019 15:45:40  Jose Francisco Busch  US guided biopsy of liver 23-Sep-2019 16:25:17  Jose Francisco Busch

## 2019-09-26 NOTE — PROGRESS NOTE ADULT - SUBJECTIVE AND OBJECTIVE BOX
78 y/o F HTN, chronic B/L knee pain, hyperlipidemia, vit D , gall bladder stone , hepatic cyst , small to moderate hemmorrhoids/diverticulosis/tortuous twisted colon/colonoscopy 10/2014, GERD/gastritis/polypoid mucosa. Polypoid lesion with central ulcer/HP gastritis resolved EGD 9/2014, post menopausal hysterectomy 1997, osteopenia, NAFLD,  Patient was sent to ED by her gastroenterologist Dr Yeung for evaluation of abdominal pain /epigastric region constant for 2 weeks .Described as burning .associated with nausea ,  loss of appetite and weight loss 10 lbs in past 2 weeks , bloating . 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 mets to liver , obscurred pancreatic head  9/22- remains afebrile , WBC wnl, still with persistent abd pain   Constitutional: NAD  	HEENT: Atraumatic, AARON, Normal, No congestion  	Respiratory: Breath Sounds normal, no rhonchi/wheeze  	Cardiovascular: N S1S2;   	Gastrointestinal: Abdomen soft, epigastric tenedrness, no rebound  	Extremities: No edema,   	Neurological: AAO x 3, no gross focal motor deficits  	Breasts: Deferred  	Genitourinary: deferred      PHYSICAL EXAM:    Daily Height in cm: 154.94 (21 Sep 2019 16:40)    Daily     ICU Vital Signs Last 24 Hrs  T(C): 36.9 (22 Sep 2019 05:30), Max: 37 (21 Sep 2019 15:24)  T(F): 98.4 (22 Sep 2019 05:30), Max: 98.6 (21 Sep 2019 15:24)  HR: 83 (22 Sep 2019 05:30) (83 - 99)  BP: 127/59 (22 Sep 2019 05:30) (117/58 - 131/61)  BP(mean): --  ABP: --  ABP(mean): --  RR: 17 (22 Sep 2019 05:30) (15 - 18)  SpO2: 96% (22 Sep 2019 05:30) (96% - 100%)                            9.1    19.76 )-----------( 302      ( 22 Sep 2019 07:06 )             28.4       CBC Full  -  ( 22 Sep 2019 07:06 )  WBC Count : 19.76 K/uL  RBC Count : 3.18 M/uL  Hemoglobin : 9.1 g/dL  Hematocrit : 28.4 %  Platelet Count - Automated : 302 K/uL  Mean Cell Volume : 89.3 fl  Mean Cell Hemoglobin : 28.6 pg  Mean Cell Hemoglobin Concentration : 32.0 gm/dL  Auto Neutrophil # : 16.72 K/uL  Auto Lymphocyte # : 1.10 K/uL  Auto Monocyte # : 1.47 K/uL  Auto Eosinophil # : 0.31 K/uL  Auto Basophil # : 0.04 K/uL  Auto Neutrophil % : 84.6 %  Auto Lymphocyte % : 5.6 %  Auto Monocyte % : 7.4 %  Auto Eosinophil % : 1.6 %  Auto Basophil % : 0.2 %      09-22    139  |  107  |  12  ----------------------------<  134<H>  3.8   |  22  |  0.78    Ca    8.5      22 Sep 2019 07:06    TPro  6.3  /  Alb  2.1<L>  /  TBili  0.4  /  DBili  x   /  AST  26  /  ALT  27  /  AlkPhos  253<H>  09-22      LIVER FUNCTIONS - ( 22 Sep 2019 07:06 )  Alb: 2.1 g/dL / Pro: 6.3 gm/dL / ALK PHOS: 253 U/L / ALT: 27 U/L / AST: 26 U/L / GGT: x             PT/INR - ( 21 Sep 2019 12:07 )   PT: 16.4 sec;   INR: 1.46 ratio         PTT - ( 21 Sep 2019 12:07 )  PTT:33.1 sec                  MEDICATIONS  (STANDING):  dextrose 5% + sodium chloride 0.9%. 1000 milliLiter(s) (70 mL/Hr) IV Continuous <Continuous>  enoxaparin Injectable 40 milliGRAM(s) SubCutaneous daily  pantoprazole  Injectable 40 milliGRAM(s) IV Push daily  piperacillin/tazobactam IVPB.. 3.375 Gram(s) IV Intermittent every 8 hours
78 y/o F HTN, chronic B/L knee pain, hyperlipidemia, vit D , gall bladder stone , hepatic cyst , small to moderate hemmorrhoids/diverticulosis/tortuous twisted colon/colonoscopy 10/2014, GERD/gastritis/polypoid mucosa. Polypoid lesion with central ulcer/HP gastritis resolved EGD 9/2014, post menopausal hysterectomy 1997, osteopenia, NAFLD,  Patient was sent to ED by her gastroenterologist Dr Yeung for evaluation of abdominal pain /epigastric region constant for 2 weeks .Described as burning .associated with nausea ,  loss of appetite and weight loss 10 lbs in past 2 weeks , bloating . 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 mets to liver , obscurred pancreatic head  9/24- remains afebrile , still with persistent abd pain  s/p Ct guided bx 9/23   Constitutional: NAD  	HEENT: Atraumatic, AARON, Normal, No congestion  	Respiratory: Breath Sounds normal, no rhonchi/wheeze  	Cardiovascular: N S1S2;   	Gastrointestinal: Abdomen soft, epigastric tenedrness, no rebound  	Extremities: No edema,   	Neurological: AAO x 3, no gross focal motor deficits  	Breasts: Deferred  	Genitourinary: deferred      PHYSICAL EXAM:    Daily     Daily     ICU Vital Signs Last 24 Hrs  T(C): 36.9 (24 Sep 2019 05:02), Max: 37.4 (23 Sep 2019 14:30)  T(F): 98.4 (24 Sep 2019 05:02), Max: 99.3 (23 Sep 2019 14:30)  HR: 83 (24 Sep 2019 05:02) (79 - 96)  BP: 117/46 (24 Sep 2019 05:02) (107/61 - 135/60)  BP(mean): 63 (24 Sep 2019 05:02) (63 - 63)  ABP: --  ABP(mean): --  RR: 18 (24 Sep 2019 05:02) (14 - 18)  SpO2: 96% (24 Sep 2019 05:02) (95% - 98%)                              9.3    17.94 )-----------( 262      ( 24 Sep 2019 06:52 )             29.6       CBC Full  -  ( 24 Sep 2019 06:52 )  WBC Count : 17.94 K/uL  RBC Count : 3.25 M/uL  Hemoglobin : 9.3 g/dL  Hematocrit : 29.6 %  Platelet Count - Automated : 262 K/uL  Mean Cell Volume : 91.1 fl  Mean Cell Hemoglobin : 28.6 pg  Mean Cell Hemoglobin Concentration : 31.4 gm/dL  Auto Neutrophil # : 14.69 K/uL  Auto Lymphocyte # : 1.30 K/uL  Auto Monocyte # : 1.35 K/uL  Auto Eosinophil # : 0.37 K/uL  Auto Basophil # : 0.06 K/uL  Auto Neutrophil % : 82.0 %  Auto Lymphocyte % : 7.2 %  Auto Monocyte % : 7.5 %  Auto Eosinophil % : 2.1 %  Auto Basophil % : 0.3 %      09-24    138  |  106  |  16  ----------------------------<  118<H>  3.9   |  24  |  0.79    Ca    8.9      24 Sep 2019 06:52    TPro  7.0  /  Alb  2.3<L>  /  TBili  0.9  /  DBili  x   /  AST  101<H>  /  ALT  58  /  AlkPhos  532<H>  09-24      LIVER FUNCTIONS - ( 24 Sep 2019 06:52 )  Alb: 2.3 g/dL / Pro: 7.0 gm/dL / ALK PHOS: 532 U/L / ALT: 58 U/L / AST: 101 U/L / GGT: x                               MEDICATIONS  (STANDING):  lisinopril 10 milliGRAM(s) Oral daily  pantoprazole  Injectable 40 milliGRAM(s) IV Push daily  piperacillin/tazobactam IVPB.. 3.375 Gram(s) IV Intermittent every 8 hours  sodium chloride 0.9%. 1000 milliLiter(s) (50 mL/Hr) IV Continuous <Continuous>  sodium chloride 0.9%. 1000 milliLiter(s) (50 mL/Hr) IV Continuous <Continuous>
80 y/o F HTN, chronic B/L knee pain, hyperlipidemia, vit D , gall bladder stone , hepatic cyst , small to moderate hemmorrhoids/diverticulosis/tortuous twisted colon/colonoscopy 10/2014, GERD/gastritis/polypoid mucosa. Polypoid lesion with central ulcer/HP gastritis resolved EGD 9/2014, post menopausal hysterectomy 1997, osteopenia, NAFLD,  Patient was sent to ED by her gastroenterologist Dr Yeung for evaluation of abdominal pain /epigastric region constant for 2 weeks .Described as burning .associated with nausea ,  loss of appetite and weight loss 10 lbs in past 2 weeks , bloating . 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 mets to liver , obscurred pancreatic head  9/25- remains afebrile , still with persistent abd pain  s/p Ct guided bx 9/23   Constitutional: NAD  	HEENT: Atraumatic, AARON, Normal, No congestion  	Respiratory: Breath Sounds normal, no rhonchi/wheeze  	Cardiovascular: N S1S2;   	Gastrointestinal: Abdomen soft, epigastric tenedrness, no rebound  	Extremities: No edema,   	Neurological: AAO x 3, no gross focal motor deficits  	Breasts: Deferred  	Genitourinary: deferred      PHYSICAL EXAM:    Daily     Daily     ICU Vital Signs Last 24 Hrs  T(C): 36.9 (25 Sep 2019 11:31), Max: 37.2 (24 Sep 2019 21:18)  T(F): 98.4 (25 Sep 2019 11:31), Max: 98.9 (24 Sep 2019 21:18)  HR: 82 (25 Sep 2019 11:31) (82 - 98)  BP: 123/51 (25 Sep 2019 11:31) (122/59 - 130/67)  BP(mean): --  ABP: --  ABP(mean): --  RR: 18 (25 Sep 2019 11:31) (18 - 18)  SpO2: 97% (25 Sep 2019 11:31) (97% - 97%)                                9.1    18.33 )-----------( 231      ( 25 Sep 2019 06:51 )             28.5       CBC Full  -  ( 25 Sep 2019 06:51 )  WBC Count : 18.33 K/uL  RBC Count : 3.17 M/uL  Hemoglobin : 9.1 g/dL  Hematocrit : 28.5 %  Platelet Count - Automated : 231 K/uL  Mean Cell Volume : 89.9 fl  Mean Cell Hemoglobin : 28.7 pg  Mean Cell Hemoglobin Concentration : 31.9 gm/dL  Auto Neutrophil # : 15.00 K/uL  Auto Lymphocyte # : 1.34 K/uL  Auto Monocyte # : 1.27 K/uL  Auto Eosinophil # : 0.52 K/uL  Auto Basophil # : 0.06 K/uL  Auto Neutrophil % : 81.9 %  Auto Lymphocyte % : 7.3 %  Auto Monocyte % : 6.9 %  Auto Eosinophil % : 2.8 %  Auto Basophil % : 0.3 %      09-25    139  |  106  |  11  ----------------------------<  119<H>  3.5   |  21<L>  |  0.61    Ca    8.3<L>      25 Sep 2019 06:51    TPro  6.6  /  Alb  2.0<L>  /  TBili  0.8  /  DBili  x   /  AST  82<H>  /  ALT  58  /  AlkPhos  563<H>  09-25      LIVER FUNCTIONS - ( 25 Sep 2019 06:51 )  Alb: 2.0 g/dL / Pro: 6.6 gm/dL / ALK PHOS: 563 U/L / ALT: 58 U/L / AST: 82 U/L / GGT: x                               MEDICATIONS  (STANDING):  lisinopril 10 milliGRAM(s) Oral daily  pantoprazole  Injectable 40 milliGRAM(s) IV Push daily  sodium chloride 0.9%. 1000 milliLiter(s) (60 mL/Hr) IV Continuous <Continuous>
Interval History:    MEDICATIONS  (STANDING):  enoxaparin Injectable 40 milliGRAM(s) SubCutaneous daily  lisinopril 10 milliGRAM(s) Oral daily  pantoprazole  Injectable 40 milliGRAM(s) IV Push daily  piperacillin/tazobactam IVPB.. 3.375 Gram(s) IV Intermittent every 8 hours  sodium chloride 0.9%. 1000 milliLiter(s) (50 mL/Hr) IV Continuous <Continuous>  sodium chloride 0.9%. 1000 milliLiter(s) (50 mL/Hr) IV Continuous <Continuous>  sodium chloride 0.9%. 1000 milliLiter(s) (60 mL/Hr) IV Continuous <Continuous>    MEDICATIONS  (PRN):  HYDROmorphone  Injectable 0.5 milliGRAM(s) IV Push every 4 hours PRN Severe Pain (7 - 10)  ondansetron Injectable 4 milliGRAM(s) IV Push every 8 hours PRN Nausea and/or Vomiting  oxyCODONE    IR 5 milliGRAM(s) Oral every 6 hours PRN Moderate Pain (4 - 6)      Daily     Daily   BMI: 26.7 (09-21 @ 16:40)  Change in Weight:  Vital Signs Last 24 Hrs  T(C): 36.8 (24 Sep 2019 11:41), Max: 37.4 (23 Sep 2019 20:59)  T(F): 98.2 (24 Sep 2019 11:41), Max: 99.3 (23 Sep 2019 20:59)  HR: 82 (24 Sep 2019 11:41) (82 - 96)  BP: 143/56 (24 Sep 2019 11:41) (117/46 - 143/56)  BP(mean): 63 (24 Sep 2019 05:02) (63 - 63)  RR: 18 (24 Sep 2019 11:41) (18 - 18)  SpO2: 97% (24 Sep 2019 11:41) (96% - 97%)  I&O's Detail    23 Sep 2019 07:01  -  24 Sep 2019 07:00  --------------------------------------------------------  IN:    Other: 100 mL  Total IN: 100 mL    OUT:  Total OUT: 0 mL    Total NET: 100 mL      24 Sep 2019 07:01  -  24 Sep 2019 15:50  --------------------------------------------------------  IN:    Oral Fluid: 240 mL  Total IN: 240 mL    OUT:  Total OUT: 0 mL    Total NET: 240 mL          PHYSICAL EXAM  General:  Well developed, well nourished, alert and active, no pallor, NAD.  HEENT:    Normal appearance of conjunctiva, ears, nose, lips, oropharynx, and oral mucosa, anicteric.  Neck:  No masses, no asymmetry.  Lymph Nodes:  No lymphadenopathy.   Cardiovascular:  RRR normal S1/S2, no murmur.  Respiratory:  CTA B/L, normal respiratory effort.   Abdominal:   soft, no masses or tenderness, normoactive BS, NT/ND, no HSM.  Extremities:   No clubbing or cyanosis, normal capillary refill, no edema.   Skin:   No rash, jaundice, lesions, eczema.   Musculoskeletal:  No joint swelling, erythema or tenderness.   Other:     Lab Results:                        9.3    17.94 )-----------( 262      ( 24 Sep 2019 06:52 )             29.6     09-24    138  |  106  |  16  ----------------------------<  118<H>  3.9   |  24  |  0.79    Ca    8.9      24 Sep 2019 06:52    TPro  7.0  /  Alb  2.3<L>  /  TBili  0.9  /  DBili  x   /  AST  101<H>  /  ALT  58  /  AlkPhos  532<H>  09-24    LIVER FUNCTIONS - ( 24 Sep 2019 06:52 )  Alb: 2.3 g/dL / Pro: 7.0 gm/dL / ALK PHOS: 532 U/L / ALT: 58 U/L / AST: 101 U/L / GGT: x                 Stool Results:          RADIOLOGY RESULTS:    SURGICAL PATHOLOGY:
Interval History:    MEDICATIONS  (STANDING):  lisinopril 10 milliGRAM(s) Oral daily  pantoprazole  Injectable 40 milliGRAM(s) IV Push daily  piperacillin/tazobactam IVPB.. 3.375 Gram(s) IV Intermittent every 8 hours  sodium chloride 0.9%. 1000 milliLiter(s) (50 mL/Hr) IV Continuous <Continuous>  sodium chloride 0.9%. 1000 milliLiter(s) (50 mL/Hr) IV Continuous <Continuous>    MEDICATIONS  (PRN):  HYDROmorphone  Injectable 0.5 milliGRAM(s) IV Push every 4 hours PRN Severe Pain (7 - 10)  ondansetron Injectable 4 milliGRAM(s) IV Push every 8 hours PRN Nausea and/or Vomiting  oxyCODONE    IR 5 milliGRAM(s) Oral every 6 hours PRN Moderate Pain (4 - 6)      Daily     Daily   BMI: 26.7 (09-21 @ 16:40)  Change in Weight:  Vital Signs Last 24 Hrs  T(C): 36.9 (23 Sep 2019 04:39), Max: 36.9 (22 Sep 2019 11:44)  T(F): 98.5 (23 Sep 2019 04:39), Max: 98.5 (22 Sep 2019 11:44)  HR: 83 (23 Sep 2019 04:39) (83 - 89)  BP: 133/50 (23 Sep 2019 04:39) (103/55 - 133/50)  BP(mean): --  RR: 17 (23 Sep 2019 04:39) (16 - 17)  SpO2: 98% (23 Sep 2019 04:39) (94% - 98%)  I&O's Detail      PHYSICAL EXAM  General:  Well developed, well nourished, alert and active, no pallor, NAD.  HEENT:    Normal appearance of conjunctiva, ears, nose, lips, oropharynx, and oral mucosa, anicteric.  Neck:  No masses, no asymmetry.  Lymph Nodes:  No lymphadenopathy.   Cardiovascular:  RRR normal S1/S2, no murmur.  Respiratory:  CTA B/L, normal respiratory effort.   Abdominal:   soft, no masses or tenderness, normoactive BS, NT/ND, no HSM.  Extremities:   No clubbing or cyanosis, normal capillary refill, no edema.   Skin:   No rash, jaundice, lesions, eczema.   Musculoskeletal:  No joint swelling, erythema or tenderness.   Other:     Lab Results:                        8.8    15.26 )-----------( 276      ( 23 Sep 2019 07:17 )             27.6     09-23    138  |  106  |  15  ----------------------------<  104<H>  3.8   |  22  |  0.74    Ca    8.3<L>      23 Sep 2019 07:17    TPro  6.5  /  Alb  2.1<L>  /  TBili  0.4  /  DBili  x   /  AST  44<H>  /  ALT  31  /  AlkPhos  322<H>  09-23    LIVER FUNCTIONS - ( 23 Sep 2019 07:17 )  Alb: 2.1 g/dL / Pro: 6.5 gm/dL / ALK PHOS: 322 U/L / ALT: 31 U/L / AST: 44 U/L / GGT: x           PT/INR - ( 21 Sep 2019 12:07 )   PT: 16.4 sec;   INR: 1.46 ratio         PTT - ( 21 Sep 2019 12:07 )  PTT:33.1 sec      Stool Results:          RADIOLOGY RESULTS:    SURGICAL PATHOLOGY:
Patient is a 79y old  Female who presents with a chief complaint of abdominal pain x2 weeks , nausea , sent to ED (22 Sep 2019 08:32)    Date of service: 09-23-19 @ 10:12    Patient sitting in chair; still with right upper quadrant pain, afebrile        ROS: no fever or chills; denies dizziness, no HA, no SOB or cough, no diarrhea or constipation; no dysuria, no urinary frequency, no legs pain, no rashes    MEDICATIONS  (STANDING):  lisinopril 10 milliGRAM(s) Oral daily  pantoprazole  Injectable 40 milliGRAM(s) IV Push daily  piperacillin/tazobactam IVPB.. 3.375 Gram(s) IV Intermittent every 8 hours  sodium chloride 0.9%. 1000 milliLiter(s) (50 mL/Hr) IV Continuous <Continuous>  sodium chloride 0.9%. 1000 milliLiter(s) (50 mL/Hr) IV Continuous <Continuous>    MEDICATIONS  (PRN):  HYDROmorphone  Injectable 0.5 milliGRAM(s) IV Push every 4 hours PRN Severe Pain (7 - 10)  ondansetron Injectable 4 milliGRAM(s) IV Push every 8 hours PRN Nausea and/or Vomiting  oxyCODONE    IR 5 milliGRAM(s) Oral every 6 hours PRN Moderate Pain (4 - 6)      Vital Signs Last 24 Hrs  T(C): 36.9 (23 Sep 2019 04:39), Max: 36.9 (22 Sep 2019 11:44)  T(F): 98.5 (23 Sep 2019 04:39), Max: 98.5 (22 Sep 2019 11:44)  HR: 83 (23 Sep 2019 04:39) (83 - 89)  BP: 133/50 (23 Sep 2019 04:39) (103/55 - 133/50)  BP(mean): --  RR: 17 (23 Sep 2019 04:39) (16 - 17)  SpO2: 98% (23 Sep 2019 04:39) (94% - 98%)    Physical Exam:            PE:    Constitutional: frail looking  HEENT: NC/AT, EOMI, PERRLA, conjunctivae clear; ears and nose atraumatic; pharynx clear  Neck: supple; thyroid not palpable  Back: no tenderness  Respiratory: respiratory effort normal; clear to auscultation  Cardiovascular: S1S2 regular, no murmurs  Abdomen: soft, right upper and mid quadrant tenderness; not distended, positive BS; no liver or spleen organomegaly  Genitourinary: no suprapubic tenderness  Musculoskeletal: no muscle tenderness, no joint swelling or tenderness  Neurological/ Psychiatric: AxOx3, judgement and insight normal;  moving all extremities  Skin: no rashes; no palpable lesions    Labs: all available labs reviewed                          Labs:                        8.8    15.26 )-----------( 276      ( 23 Sep 2019 07:17 )             27.6     09-23    138  |  106  |  15  ----------------------------<  104<H>  3.8   |  22  |  0.74    Ca    8.3<L>      23 Sep 2019 07:17    TPro  6.5  /  Alb  2.1<L>  /  TBili  0.4  /  DBili  x   /  AST  44<H>  /  ALT  31  /  AlkPhos  322<H>  09-23           Cultures:       Culture - Blood (collected 09-21-19 @ 15:16)  Source: .Blood None  Preliminary Report (09-22-19 @ 20:00):    No growth to date.    Culture - Blood (collected 09-21-19 @ 15:16)  Source: .Blood None  Preliminary Report (09-22-19 @ 20:00):    No growth to date.          < from: CT Abdomen and Pelvis w/ IV Cont (09.21.19 @ 13:22) >    EXAM:  CT ABDOMEN AND PELVIS IC                            PROCEDURE DATE:  09/21/2019          INTERPRETATION:  CLINICAL INFORMATION: Abdominal pain, masses    COMPARISON: None.    PROCEDURE:   CT of the Abdomen and Pelvis was performed with intravenous contrast.   Intravenous contrast: 90 ml Omnipaque 350. 10 ml discarded.  Oral contrast: None.  Sagittal and coronal reformats were performed.    FINDINGS:    LOWER CHEST: Within normal limits.    LIVER: The large mass involving the right lobe of the liver primarily   segment 5. This measures at at these 7 x 8 x 11 cm. The adjacent   gallbladder is abnormal in appearance. Findings may represent gallbladder   malignancy with extension into the liver, however other sources of   malignancy are not excluded. Numerous small satellite lesions are also   seen throughout the liver.  BILE DUCTS: Normal caliber.  GALLBLADDER: Abnormal, see above under liver.  SPLEEN: Within normal limits.  PANCREAS: Pancreatic head is obscured. The body and tail are unremarkable.  ADRENALS: Within normal limits.  KIDNEYS/URETERS: Subcentimeter right cortical hypodensity too small to   characterize. The kidneys are otherwise unremarkable    BLADDER: Within normal limits.  REPRODUCTIVE ORGANS: Hysterectomy.    BOWEL: Scattered colonic diverticulosis. No bowel obstruction. Appendix   normal  PERITONEUM: Moderate amount of simple free fluid in the pelvis.  VESSELS: Within normal limits.  RETROPERITONEUM/LYMPH NODES: 4 x 5 cm mass at 4-5. This likely represents  a large lymph node. Other enlarged periportal nodes are present.    ABDOMINAL WALL: Within normal limits.  BONES: Within normal limits.    IMPRESSION:     Large mass involving the right lobe of the liver primarily segment 5.   This measures at  least 7 x 8 x 11 cm. The adjacent gallbladder is   abnormal in appearance. Findings may represent gallbladder malignancy   with extension into the liver, however other sources of malignancy are   not excluded. Numerous small satellite lesions are also seen throughout   the liver. Large periportal adenopathy.            < end of copied text >        Radiology: all available radiological tests reviewed    Advanced directives addressed: full resuscitation
Patient is a 79y old  Female who presents with a chief complaint of abdominal pain x2 weeks , nausea , sent to ED (22 Sep 2019 08:32)    Date of service: 09-25-19 @ 09:50      Patient sitting in bed; awaiting MRI      ROS unable to obtain secondary to patient medical condition     MEDICATIONS  (STANDING):  lisinopril 10 milliGRAM(s) Oral daily  pantoprazole  Injectable 40 milliGRAM(s) IV Push daily  sodium chloride 0.9%. 1000 milliLiter(s) (50 mL/Hr) IV Continuous <Continuous>  sodium chloride 0.9%. 1000 milliLiter(s) (50 mL/Hr) IV Continuous <Continuous>  sodium chloride 0.9%. 1000 milliLiter(s) (60 mL/Hr) IV Continuous <Continuous>    MEDICATIONS  (PRN):  HYDROmorphone  Injectable 0.5 milliGRAM(s) IV Push every 4 hours PRN Severe Pain (7 - 10)  ondansetron Injectable 4 milliGRAM(s) IV Push every 8 hours PRN Nausea and/or Vomiting  oxyCODONE    IR 5 milliGRAM(s) Oral every 6 hours PRN Moderate Pain (4 - 6)      Vital Signs Last 24 Hrs  T(C): 37.1 (25 Sep 2019 05:43), Max: 37.2 (24 Sep 2019 21:18)  T(F): 98.8 (25 Sep 2019 05:43), Max: 98.9 (24 Sep 2019 21:18)  HR: 89 (25 Sep 2019 05:43) (82 - 98)  BP: 122/59 (25 Sep 2019 05:43) (122/59 - 143/56)  BP(mean): --  RR: 18 (24 Sep 2019 21:18) (18 - 18)  SpO2: 97% (25 Sep 2019 05:43) (97% - 97%)    Physical Exam:            PE:    Constitutional: frail looking  HEENT: NC/AT, EOMI, PERRLA, conjunctivae clear; ears and nose atraumatic; pharynx clear  Neck: supple; thyroid not palpable  Back: no tenderness  Respiratory: respiratory effort normal; clear to auscultation  Cardiovascular: S1S2 regular, no murmurs  Abdomen: soft, right upper and mid quadrant tenderness; not distended, positive BS; no liver or spleen organomegaly  Genitourinary: no suprapubic tenderness  Musculoskeletal: no muscle tenderness, no joint swelling or tenderness  Neurological/ Psychiatric: AxOx3, judgement and insight normal;  moving all extremities  Skin: no rashes; no palpable lesions    Labs: all available labs reviewed                          Labs:                       Labs:                        9.1    18.33 )-----------( 231      ( 25 Sep 2019 06:51 )             28.5     09-25    139  |  106  |  11  ----------------------------<  119<H>  3.5   |  21<L>  |  0.61    Ca    8.3<L>      25 Sep 2019 06:51    TPro  6.6  /  Alb  2.0<L>  /  TBili  0.8  /  DBili  x   /  AST  82<H>  /  ALT  58  /  AlkPhos  563<H>  09-25           Cultures:       Culture - Blood (collected 09-21-19 @ 15:16)  Source: .Blood None  Preliminary Report (09-22-19 @ 20:00):    No growth to date.    Culture - Blood (collected 09-21-19 @ 15:16)  Source: .Blood None  Preliminary Report (09-22-19 @ 20:00):    No growth to date.            < from: CT Abdomen and Pelvis w/ IV Cont (09.21.19 @ 13:22) >    EXAM:  CT ABDOMEN AND PELVIS IC                            PROCEDURE DATE:  09/21/2019          INTERPRETATION:  CLINICAL INFORMATION: Abdominal pain, masses    COMPARISON: None.    PROCEDURE:   CT of the Abdomen and Pelvis was performed with intravenous contrast.   Intravenous contrast: 90 ml Omnipaque 350. 10 ml discarded.  Oral contrast: None.  Sagittal and coronal reformats were performed.    FINDINGS:    LOWER CHEST: Within normal limits.    LIVER: The large mass involving the right lobe of the liver primarily   segment 5. This measures at at these 7 x 8 x 11 cm. The adjacent   gallbladder is abnormal in appearance. Findings may represent gallbladder   malignancy with extension into the liver, however other sources of   malignancy are not excluded. Numerous small satellite lesions are also   seen throughout the liver.  BILE DUCTS: Normal caliber.  GALLBLADDER: Abnormal, see above under liver.  SPLEEN: Within normal limits.  PANCREAS: Pancreatic head is obscured. The body and tail are unremarkable.  ADRENALS: Within normal limits.  KIDNEYS/URETERS: Subcentimeter right cortical hypodensity too small to   characterize. The kidneys are otherwise unremarkable    BLADDER: Within normal limits.  REPRODUCTIVE ORGANS: Hysterectomy.    BOWEL: Scattered colonic diverticulosis. No bowel obstruction. Appendix   normal  PERITONEUM: Moderate amount of simple free fluid in the pelvis.  VESSELS: Within normal limits.  RETROPERITONEUM/LYMPH NODES: 4 x 5 cm mass at 4-5. This likely represents  a large lymph node. Other enlarged periportal nodes are present.    ABDOMINAL WALL: Within normal limits.  BONES: Within normal limits.    IMPRESSION:     Large mass involving the right lobe of the liver primarily segment 5.   This measures at  least 7 x 8 x 11 cm. The adjacent gallbladder is   abnormal in appearance. Findings may represent gallbladder malignancy   with extension into the liver, however other sources of malignancy are   not excluded. Numerous small satellite lesions are also seen throughout   the liver. Large periportal adenopathy.            < end of copied text >        Radiology: all available radiological tests reviewed    Advanced directives addressed: full resuscitation
Patient is a 79y old  Female who presents with a chief complaint of abdominal pain x2 weeks , nausea , sent to ED (22 Sep 2019 08:32)    Date of service: 09-26-19 @ 13:13    Patient is sitting in chair; has persistent right upper quadrant discomfort; is afebrile and anticipates mediport placement        ROS: no fever or chills; denies dizziness, no HA, no SOB or cough, no abdominal pain, no diarrhea or constipation; no dysuria, no urinary frequency, no legs pain, no rashes    MEDICATIONS  (STANDING):  dextrose 5% + sodium chloride 0.45%. 1000 milliLiter(s) (60 mL/Hr) IV Continuous <Continuous>  lisinopril 10 milliGRAM(s) Oral daily  pantoprazole  Injectable 40 milliGRAM(s) IV Push daily    MEDICATIONS  (PRN):  HYDROmorphone  Injectable 0.5 milliGRAM(s) IV Push every 4 hours PRN Severe Pain (7 - 10)  ondansetron Injectable 4 milliGRAM(s) IV Push every 8 hours PRN Nausea and/or Vomiting  oxyCODONE    IR 5 milliGRAM(s) Oral every 6 hours PRN Moderate Pain (4 - 6)      Vital Signs Last 24 Hrs  T(C): 37.4 (26 Sep 2019 13:09), Max: 37.4 (26 Sep 2019 13:09)  T(F): 99.3 (26 Sep 2019 13:09), Max: 99.3 (26 Sep 2019 13:09)  HR: 92 (26 Sep 2019 13:09) (87 - 92)  BP: 125/58 (26 Sep 2019 13:09) (117/48 - 138/68)  BP(mean): --  RR: 17 (26 Sep 2019 13:09) (17 - 18)  SpO2: 96% (26 Sep 2019 13:09) (96% - 96%)    Physical Exam:          PE:    Constitutional: frail looking  HEENT: NC/AT, EOMI, PERRLA, conjunctivae clear; ears and nose atraumatic; pharynx clear  Labs:                        9.1    18.33 )-----------( 231      ( 25 Sep 2019 06:51 )             28.5     09-26    135  |  106  |  11  ----------------------------<  134<H>  4.2   |  21<L>  |  0.55    Ca    8.7      26 Sep 2019 06:51    TPro  6.6  /  Alb  2.0<L>  /  TBili  0.8  /  DBili  x   /  AST  82<H>  /  ALT  58  /  AlkPhos  563<H>  09-25           Cultures:       Culture - Blood (collected 09-21-19 @ 15:16)  Source: .Blood None  Preliminary Report (09-22-19 @ 20:00):    No growth to date.    Culture - Blood (collected 09-21-19 @ 15:16)  Source: .Blood None  Preliminary Report (09-22-19 @ 20:00):    No growth to date.    Neck: supple; thyroid not palpable  Back: no tenderness  Respiratory: respiratory effort normal; clear to auscultation  Cardiovascular: S1S2 regular, no murmurs  Abdomen: soft, right upper and mid quadrant tenderness; not distended, positive BS; no liver or spleen organomegaly  Genitourinary: no suprapubic tenderness  Musculoskeletal: no muscle tenderness, no joint swelling or tenderness  Neurological/ Psychiatric: AxOx3, judgement and insight normal;  moving all extremities  Skin: no rashes; no palpable lesions    Labs: all available labs reviewed                     < from: CT Abdomen and Pelvis w/ IV Cont (09.21.19 @ 13:22) >    EXAM:  CT ABDOMEN AND PELVIS IC                            PROCEDURE DATE:  09/21/2019          INTERPRETATION:  CLINICAL INFORMATION: Abdominal pain, masses    COMPARISON: None.    PROCEDURE:   CT of the Abdomen and Pelvis was performed with intravenous contrast.   Intravenous contrast: 90 ml Omnipaque 350. 10 ml discarded.  Oral contrast: None.  Sagittal and coronal reformats were performed.    FINDINGS:    LOWER CHEST: Within normal limits.    LIVER: The large mass involving the right lobe of the liver primarily   segment 5. This measures at at these 7 x 8 x 11 cm. The adjacent   gallbladder is abnormal in appearance. Findings may represent gallbladder   malignancy with extension into the liver, however other sources of   malignancy are not excluded. Numerous small satellite lesions are also   seen throughout the liver.  BILE DUCTS: Normal caliber.  GALLBLADDER: Abnormal, see above under liver.  SPLEEN: Within normal limits.  PANCREAS: Pancreatic head is obscured. The body and tail are unremarkable.  ADRENALS: Within normal limits.  KIDNEYS/URETERS: Subcentimeter right cortical hypodensity too small to   characterize. The kidneys are otherwise unremarkable    BLADDER: Within normal limits.  REPRODUCTIVE ORGANS: Hysterectomy.    BOWEL: Scattered colonic diverticulosis. No bowel obstruction. Appendix   normal  PERITONEUM: Moderate amount of simple free fluid in the pelvis.  VESSELS: Within normal limits.  RETROPERITONEUM/LYMPH NODES: 4 x 5 cm mass at 4-5. This likely represents  a large lymph node. Other enlarged periportal nodes are present.    ABDOMINAL WALL: Within normal limits.  BONES: Within normal limits.    IMPRESSION:     Large mass involving the right lobe of the liver primarily segment 5.   This measures at  least 7 x 8 x 11 cm. The adjacent gallbladder is   abnormal in appearance. Findings may represent gallbladder malignancy   with extension into the liver, however other sources of malignancy are   not excluded. Numerous small satellite lesions are also seen throughout   the liver. Large periportal adenopathy.            < end of copied text >        Radiology: all available radiological tests reviewed    Advanced directives addressed: full resuscitation
Patient was seen and evaluated at bedside this morning with the surgery team. No acute events overnight. Pt offers no new complaints at this time. Denies f/c/cp/sob/n/v. Vital signs reviewed.    Vital Signs Last 24 Hrs  T(C): 37.1 (25 Sep 2019 05:43), Max: 37.2 (24 Sep 2019 21:18)  T(F): 98.8 (25 Sep 2019 05:43), Max: 98.9 (24 Sep 2019 21:18)  HR: 89 (25 Sep 2019 05:43) (82 - 98)  BP: 122/59 (25 Sep 2019 05:43) (122/59 - 143/56)  BP(mean): --  RR: 18 (24 Sep 2019 21:18) (18 - 18)  SpO2: 97% (25 Sep 2019 05:43) (97% - 97%)    Physical Exam:  gen aaox3 nad  cardiac s1 s2 rr  lungs clear  abd soft mass palpable in RUQ, mild tenderness to palpation in RUQ, no signs of peritonitis  ext no edema b/l    Labs:                        9.1    18.33 )-----------( 231      ( 25 Sep 2019 06:51 )             28.5   09-24    138  |  106  |  16  ----------------------------<  118<H>  3.9   |  24  |  0.79    Ca    8.9      24 Sep 2019 06:52    TPro  7.0  /  Alb  2.3<L>  /  TBili  0.9  /  DBili  x   /  AST  101<H>  /  ALT  58  /  AlkPhos  532<H>  09-24    Imaging:  < from: CT Chest w/ IV Cont (09.24.19 @ 12:08) >  EXAM:  CT CHEST IC                            PROCEDURE DATE:  09/24/2019          INTERPRETATION:  Chest CT with contrast dated 9/24/2019.    COMPARISON: None available.    CLINICAL INFORMATION: For staging.  TECHNIQUE: Contiguous axial 2.5 mm slice thickness images of the chest   were obtained after intravenous contrast administration.    FINDINGS:  In the inferior part of the thyroid isthmus is a 2 cm suspicious partly   enhancing nodule,for which  correlation with ultrasound commented.    The airway shows normal caliber and contour with patent lumen.    There are no focal airspace consolidations. There is a 3 mm calcified   nodule in the left upper lobe image 2-for deep. There is a 3 mm left   upper lobe indeterminate nodule image 2-47.There is a 3.2 mm   indeterminate nodule in the inferior right upper lobe on image 2-50.     There is no pleural effusion or pneumothorax.    There are no mediastinal masses or lymphadenopathy.     The mediastinum great vessels are normal.     The heart is normal. There is no pericardial effusion.    A limited evaluation of the upper abdomen again shows extensive liver   lesions and lesions in the gallbladder fossa , with invasion of the right   lobe of the liver, as was described on the earlier CT of the abdomen and   pelvis.    The bones , some degenerative changes in the thoracic spine. There is no   evidence of any destructive bone lesions.    IMPRESSION:   A suspicious enhancing thyroid lesion, for which ultrasound is   recommended for further evaluation if clinically indicated.  No definite evidence of metastases  to the chest.   Some small indeterminate lung nodules.    < end of copied text >
Patient was seen and evaluated at bedside this morning with the surgery team. No acute events overnight. Pt offers no new complaints at this time. Denies f/c/cp/sob/n/v. Vital signs reviewed.    Vital Signs Last 24 Hrs  T(C): 37.1 (26 Sep 2019 05:41), Max: 37.1 (26 Sep 2019 05:41)  T(F): 98.7 (26 Sep 2019 05:41), Max: 98.7 (26 Sep 2019 05:41)  HR: 91 (26 Sep 2019 05:41) (82 - 91)  BP: 138/68 (26 Sep 2019 05:41) (117/48 - 138/68)  BP(mean): --  RR: 18 (26 Sep 2019 05:41) (18 - 18)  SpO2: 96% (26 Sep 2019 05:41) (96% - 97%)    Physical Exam:  gen aaox3 nad  cardiac s1 s2 rr  lungs clear  abd soft mass palpable in RUQ, mild tenderness to palpation in RUQ, no signs of peritonitis  ext no edema b/l    Labs:                        9.1    18.33 )-----------( 231      ( 25 Sep 2019 06:51 )             28.5   09-25    139  |  106  |  11  ----------------------------<  119<H>  3.5   |  21<L>  |  0.61    Ca    8.3<L>      25 Sep 2019 06:51    TPro  6.6  /  Alb  2.0<L>  /  TBili  0.8  /  DBili  x   /  AST  82<H>  /  ALT  58  /  AlkPhos  563<H>  09-25
Patient was seen and evaluated at bedside this morning with the surgery team. No acute events overnight. Pt still admits to pain at the RUQ. Denies f/c/cp/sob/n/v. Vital signs reviewed.    Vital Signs Last 24 Hrs  T(C): 36.9 (23 Sep 2019 04:39), Max: 36.9 (22 Sep 2019 11:44)  T(F): 98.5 (23 Sep 2019 04:39), Max: 98.5 (22 Sep 2019 11:44)  HR: 83 (23 Sep 2019 04:39) (83 - 89)  BP: 133/50 (23 Sep 2019 04:39) (103/55 - 133/50)  BP(mean): --  RR: 17 (23 Sep 2019 04:39) (16 - 17)  SpO2: 98% (23 Sep 2019 04:39) (94% - 98%)    Physical Exam:  gen aaox3 nad  cardiac s1 s2 rr  lungs clear  abd soft mass palpable in RUQ, mild tenderness to palpation in RUQ, no signs of peritonitis  ext no edema b/l    Labs:                        8.8    15.26 )-----------( 276      ( 23 Sep 2019 07:17 )             27.6   09-23    138  |  106  |  15  ----------------------------<  104<H>  3.8   |  22  |  0.74    Ca    8.3<L>      23 Sep 2019 07:17    TPro  6.5  /  Alb  2.1<L>  /  TBili  0.4  /  DBili  x   /  AST  44<H>  /  ALT  31  /  AlkPhos  322<H>  09-23
Patient was seen and evaluated at bedside this morning with the surgery team. No acute events overnight. Pt still admits to pain at the RUQ. Denies f/c/cp/sob/n/v. Vital signs reviewed.    Vital Signs Last 24 Hrs  T(C): 36.9 (24 Sep 2019 05:02), Max: 37.4 (23 Sep 2019 14:30)  T(F): 98.4 (24 Sep 2019 05:02), Max: 99.3 (23 Sep 2019 14:30)  HR: 83 (24 Sep 2019 05:02) (79 - 96)  BP: 117/46 (24 Sep 2019 05:02) (107/61 - 135/60)  BP(mean): 63 (24 Sep 2019 05:02) (63 - 63)  RR: 18 (24 Sep 2019 05:02) (14 - 18)  SpO2: 96% (24 Sep 2019 05:02) (95% - 98%)    Physical Exam:  gen aaox3 nad  cardiac s1 s2 rr  lungs clear  abd soft mass palpable in RUQ, mild tenderness to palpation in RUQ, no signs of peritonitis  ext no edema b/l    Labs:                        9.3    17.94 )-----------( 262      ( 24 Sep 2019 06:52 )             29.6   09-24    138  |  106  |  16  ----------------------------<  118<H>  3.9   |  24  |  0.79    Ca    8.9      24 Sep 2019 06:52    TPro  7.0  /  Alb  2.3<L>  /  TBili  0.9  /  DBili  x   /  AST  101<H>  /  ALT  58  /  AlkPhos  532<H>  09-24
78 y/o F HTN, chronic B/L knee pain, hyperlipidemia, vit D , gall bladder stone , hepatic cyst , small to moderate hemmorrhoids/diverticulosis/tortuous twisted colon/colonoscopy 10/2014, GERD/gastritis/polypoid mucosa. Polypoid lesion with central ulcer/HP gastritis resolved EGD 9/2014, post menopausal hysterectomy 1997, osteopenia, NAFLD,  Patient was sent to ED by her gastroenterologist Dr Yeung for evaluation of abdominal pain /epigastric region constant for 2 weeks .Described as burning .associated with nausea ,  loss of appetite and weight loss 10 lbs in past 2 weeks , bloating . 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 mets to liver , obscurred pancreatic head  9/23- remains afebrile , still with persistent abd pain  , plan for Ct guided bx today   Constitutional: NAD  	HEENT: Atraumatic, AARON, Normal, No congestion  	Respiratory: Breath Sounds normal, no rhonchi/wheeze  	Cardiovascular: N S1S2;   	Gastrointestinal: Abdomen soft, epigastric tenedrness, no rebound  	Extremities: No edema,   	Neurological: AAO x 3, no gross focal motor deficits  	Breasts: Deferred  	Genitourinary: deferred      PHYSICAL EXAM:    Daily     Daily     ICU Vital Signs Last 24 Hrs  T(C): 37.1 (23 Sep 2019 11:21), Max: 37.1 (23 Sep 2019 11:21)  T(F): 98.8 (23 Sep 2019 11:21), Max: 98.8 (23 Sep 2019 11:21)  HR: 80 (23 Sep 2019 11:21) (80 - 89)  BP: 135/60 (23 Sep 2019 11:21) (117/47 - 135/60)  BP(mean): --  ABP: --  ABP(mean): --  RR: 18 (23 Sep 2019 11:21) (17 - 18)  SpO2: 98% (23 Sep 2019 11:21) (98% - 98%)                              8.8    15.26 )-----------( 276      ( 23 Sep 2019 07:17 )             27.6       CBC Full  -  ( 23 Sep 2019 07:17 )  WBC Count : 15.26 K/uL  RBC Count : 3.02 M/uL  Hemoglobin : 8.8 g/dL  Hematocrit : 27.6 %  Platelet Count - Automated : 276 K/uL  Mean Cell Volume : 91.4 fl  Mean Cell Hemoglobin : 29.1 pg  Mean Cell Hemoglobin Concentration : 31.9 gm/dL  Auto Neutrophil # : 12.12 K/uL  Auto Lymphocyte # : 1.30 K/uL  Auto Monocyte # : 1.21 K/uL  Auto Eosinophil # : 0.48 K/uL  Auto Basophil # : 0.06 K/uL  Auto Neutrophil % : 79.5 %  Auto Lymphocyte % : 8.5 %  Auto Monocyte % : 7.9 %  Auto Eosinophil % : 3.1 %  Auto Basophil % : 0.4 %      09-23    138  |  106  |  15  ----------------------------<  104<H>  3.8   |  22  |  0.74    Ca    8.3<L>      23 Sep 2019 07:17    TPro  6.5  /  Alb  2.1<L>  /  TBili  0.4  /  DBili  x   /  AST  44<H>  /  ALT  31  /  AlkPhos  322<H>  09-23      LIVER FUNCTIONS - ( 23 Sep 2019 07:17 )  Alb: 2.1 g/dL / Pro: 6.5 gm/dL / ALK PHOS: 322 U/L / ALT: 31 U/L / AST: 44 U/L / GGT: x             PT/INR - ( 21 Sep 2019 12:07 )   PT: 16.4 sec;   INR: 1.46 ratio         PTT - ( 21 Sep 2019 12:07 )  PTT:33.1 sec                  MEDICATIONS  (STANDING):  lisinopril 10 milliGRAM(s) Oral daily  pantoprazole  Injectable 40 milliGRAM(s) IV Push daily  piperacillin/tazobactam IVPB.. 3.375 Gram(s) IV Intermittent every 8 hours  sodium chloride 0.9%. 1000 milliLiter(s) (50 mL/Hr) IV Continuous <Continuous>  sodium chloride 0.9%. 1000 milliLiter(s) (50 mL/Hr) IV Continuous <Continuous>

## 2019-09-26 NOTE — PROGRESS NOTE ADULT - PROVIDER SPECIALTY LIST ADULT
Gastroenterology
Gastroenterology
Hospitalist
Infectious Disease
Surgery
Hospitalist

## 2019-09-26 NOTE — PROGRESS NOTE ADULT - ASSESSMENT
78 y/o F HTN, chronic B/L knee pain, hyperlipidemia, hepatic cyst , small to moderate hemmorrhoids/diverticulosis/tortuous twisted colon/colonoscopy 10/2014, GERD,  s/p  hysterectomy 1997, osteopenia, NAFLD, admitted on 9/21 for evaluation of 2 weeks history of right upper and mid quadrant abdominal pain, associated with nausea and bloating ;has had weight loss and outpatient imaging suspicious for large liver mass. Denies fever but did have some chills.   1. Patient admitted with large liver mass, questionable gall bladder lesion as well, there is concern about possible cholangitis?   - follow up cultures   - iv hydration and supportive care   - serial cbc and monitor temperature   - reviewed prior medical records to evaluate for resistant or atypical pathogens   - the patient had biopsy  - patient most likely has leukocytosis secondary to liver lesion; no evidence of acute infectious process  - okay from id standpoint to proceed with planned mediport placement  - If further ID issues please reconsult   2. other issues: per medicine

## 2019-09-26 NOTE — PROGRESS NOTE ADULT - ASSESSMENT
78 yo F with large liver lesion on CT scan with unknown primary. Pt for chemoport placement with IR today.    Plan:  -pain control prn  -c/w IVF   -f/u liver biopsy results  -GI/Oncology follow up  -medical management as per primary team  -no acute surgical intervention indicated. Please have pt follow up in Dr. Shah's office after discharge.    Plan discussed with Dr. Shah 78 yo F with large liver lesion on CT scan with unknown primary. Pt for chemoport placement with IR today.    Plan:  -pain control prn  -c/w IVF   -f/u liver biopsy results  -GI/Oncology follow up  -medical management as per primary team  -no acute surgical intervention indicated. Surgical oncology will sign off at this time. Thank you for this consult.    Plan discussed with Dr. Shah

## 2019-09-26 NOTE — CONSULT NOTE ADULT - ASSESSMENT
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 For initiation of chemotherapy.     Allergies:   Medications (Abx/Cardiac/Anticoagulation/Blood Products)    piperacillin/tazobactam IVPB..: 25 mL/Hr IV Intermittent (09-25 @ 06:31)    Data:    T(C): 37.4  HR: 92  BP: 125/58  RR: 17  SpO2: 96%    Exam  General: NAD    -WBC 18.33 / HgB 9.1 / Hct 28.5 / Plt 231  -Na 135 / Cl 106 / BUN 11 / Glucose 134  -K 4.2 / CO2 21 / Cr 0.55  -ALT -- / Alk Phos -- / T.Bili --    Plan:   -79y Female presents for port insertion  -Per discussion with ID, leukocytosis is likely reactive to CA and there is no contraindication to port placement  -Risks/Benefits/alternatives explained with the patient and/or healthcare proxy and witnessed informed consent obtained.

## 2019-09-26 NOTE — DISCHARGE NOTE NURSING/CASE MANAGEMENT/SOCIAL WORK - PATIENT PORTAL LINK FT
You can access the FollowMyHealth Patient Portal offered by [facility] by registering at the following website: [email or web address]. By joining [employer]’s FollowMyHealth portal, you will also be able to view your health information using other applications (apps) compatible with our system.

## 2019-09-26 NOTE — DISCHARGE NOTE PROVIDER - HOSPITAL COURSE
78 y/o F HTN, chronic B/L knee pain, hyperlipidemia, vit D , gall bladder stone , hepatic cyst , small to moderate hemmorrhoids/diverticulosis/tortuous twisted colon/colonoscopy 10/2014, GERD/gastritis/polypoid mucosa. Polypoid lesion with central ulcer/HP gastritis resolved EGD 9/2014, post menopausal hysterectomy 1997, osteopenia, NAFLD,    Patient was sent to ED by her gastroenterologist Dr Yeung for evaluation of abdominal pain /epigastric region constant for 2 weeks .Described as burning .associated with nausea ,  loss of appetite and weight loss 10 lbs in past 2 weeks , bloating . Has 1 Bowel movement every day with straining    Abd US done at dr yeung's office revealed pancreatic mass 4x3.8x3.8cm with retrolymphadenopathy , large liver mass 8x5.6cm suspected for liver or pancreatic malignancy         in ED WBC 23,000, HR 99, afebrile, alkphos 280, AST 51, lipase 186, CT abd/pelvis GB ,malignancy with ? mets to liver , obscurred pancreatic head    9/25- remains afebrile , still with persistent abd pain    s/p Ct guided bx 9/23            A/P    #Abdominal pain likely due to mass effect of malignancy     #liver  and GB malignant lesions unclear  primary source ? GB malignancy /not amenable to surgery per oncology and GI /     # leukocytosis likely  reactive from GB malignancy ,  cholangitis was ruled out           Patient was evaluated by GI, oncology and ID    on CT abd/pelvis  and MRCP showed extensive liver and gall bladder malignancy with biliary ductal dilation from mass effect on CBD    Ct chest done for staging showed enhasing thyroid lesion but no evidence of metastais as per report    initially started on zosyn for supiscion of cholangitis in lieu of elevated WBC 18, but eventually acute infection or cholangitis ruled out and zosyn was stopped  , blood cx neg, unlikely cholangitis as per GI    s/p CT guided  liver bx 9/23    s/p RIJV port placed by IR 9/26    plan is to follow up with oncology dr beth in 1 week to follow up results of liver bx to consider initiation of chemo ? palliative . as per oncology notes  most likely incurable     started PPI and oxycodone for pain management    i spoke with brother who patient defers decision to , he would like Patient  to pursue further diagnosis and treatment if possible     Pt will see GI and Dr lopez in 1 week    f/u dr beth as outpatient for planning curative vs palliative chemo based on bx results        #hx NAFL    #hx gastritis/polyp with central ulcer    # hx GB stone, hepatic cyst/diverticulosis    # hx HTN/HLD    continue home meds    discontinue lopid due to elevated LFT                  Constitutional: NAD    	HEENT: Atraumatic, AARON, Normal, No congestion    	Respiratory: Breath Sounds normal, no rhonchi/wheeze    	Cardiovascular: N S1S2;     	Gastrointestinal: Abdomen soft, epigastric tenedrness, no rebound    	Extremities: No edema,     	Neurological: AAO x 3, no gross focal motor deficits

## 2019-09-26 NOTE — PROGRESS NOTE ADULT - REASON FOR ADMISSION
abdominal pain x2 weeks , nausea , sent to ED

## 2019-09-26 NOTE — DISCHARGE NOTE PROVIDER - NSDCCPCAREPLAN_GEN_ALL_CORE_FT
PRINCIPAL DISCHARGE DIAGNOSIS  Diagnosis: Liver mass  Assessment and Plan of Treatment: rios follow up with Dr beth  in 1 week as scheduled for results of liver  biopsy and to decide type of chemotherapy  please also follow up with Dr german in 1 week  patsy morelos

## 2019-09-30 ENCOUNTER — EMERGENCY (EMERGENCY)
Facility: HOSPITAL | Age: 79
LOS: 0 days | Discharge: ANOTHER HOSPITAL | End: 2019-10-01
Attending: EMERGENCY MEDICINE
Payer: SELF-PAY

## 2019-09-30 VITALS
TEMPERATURE: 97 F | SYSTOLIC BLOOD PRESSURE: 83 MMHG | RESPIRATION RATE: 15 BRPM | HEART RATE: 92 BPM | WEIGHT: 139.99 LBS | HEIGHT: 62 IN | OXYGEN SATURATION: 100 % | DIASTOLIC BLOOD PRESSURE: 49 MMHG

## 2019-09-30 DIAGNOSIS — C78.7 SECONDARY MALIGNANT NEOPLASM OF LIVER AND INTRAHEPATIC BILE DUCT: ICD-10-CM

## 2019-09-30 DIAGNOSIS — K72.90 HEPATIC FAILURE, UNSPECIFIED WITHOUT COMA: ICD-10-CM

## 2019-09-30 DIAGNOSIS — R50.9 FEVER, UNSPECIFIED: ICD-10-CM

## 2019-09-30 DIAGNOSIS — C25.9 MALIGNANT NEOPLASM OF PANCREAS, UNSPECIFIED: ICD-10-CM

## 2019-09-30 DIAGNOSIS — C22.9 MALIGNANT NEOPLASM OF LIVER, NOT SPECIFIED AS PRIMARY OR SECONDARY: ICD-10-CM

## 2019-09-30 DIAGNOSIS — A41.9 SEPSIS, UNSPECIFIED ORGANISM: ICD-10-CM

## 2019-09-30 PROCEDURE — 94660 CPAP INITIATION&MGMT: CPT

## 2019-09-30 PROCEDURE — 76705 ECHO EXAM OF ABDOMEN: CPT

## 2019-09-30 PROCEDURE — 86901 BLOOD TYPING SEROLOGIC RH(D): CPT

## 2019-09-30 PROCEDURE — 99292 CRITICAL CARE ADDL 30 MIN: CPT

## 2019-09-30 PROCEDURE — 87186 SC STD MICRODIL/AGAR DIL: CPT

## 2019-09-30 PROCEDURE — 87150 DNA/RNA AMPLIFIED PROBE: CPT

## 2019-09-30 PROCEDURE — 80053 COMPREHEN METABOLIC PANEL: CPT

## 2019-09-30 PROCEDURE — 96367 TX/PROPH/DG ADDL SEQ IV INF: CPT | Mod: 59

## 2019-09-30 PROCEDURE — 86900 BLOOD TYPING SEROLOGIC ABO: CPT

## 2019-09-30 PROCEDURE — 99291 CRITICAL CARE FIRST HOUR: CPT

## 2019-09-30 PROCEDURE — 71045 X-RAY EXAM CHEST 1 VIEW: CPT

## 2019-09-30 PROCEDURE — 85610 PROTHROMBIN TIME: CPT

## 2019-09-30 PROCEDURE — 85025 COMPLETE CBC W/AUTO DIFF WBC: CPT

## 2019-09-30 PROCEDURE — 36415 COLL VENOUS BLD VENIPUNCTURE: CPT

## 2019-09-30 PROCEDURE — 96375 TX/PRO/DX INJ NEW DRUG ADDON: CPT | Mod: 59

## 2019-09-30 PROCEDURE — 86850 RBC ANTIBODY SCREEN: CPT

## 2019-09-30 PROCEDURE — 87040 BLOOD CULTURE FOR BACTERIA: CPT | Mod: 91

## 2019-09-30 PROCEDURE — 96376 TX/PRO/DX INJ SAME DRUG ADON: CPT | Mod: 59

## 2019-09-30 PROCEDURE — 85730 THROMBOPLASTIN TIME PARTIAL: CPT

## 2019-09-30 PROCEDURE — 99291 CRITICAL CARE FIRST HOUR: CPT | Mod: 25

## 2019-09-30 PROCEDURE — 83605 ASSAY OF LACTIC ACID: CPT

## 2019-09-30 PROCEDURE — 96365 THER/PROPH/DIAG IV INF INIT: CPT | Mod: 59

## 2019-09-30 RX ORDER — SODIUM CHLORIDE 9 MG/ML
1950 INJECTION, SOLUTION INTRAVENOUS ONCE
Refills: 0 | Status: COMPLETED | OUTPATIENT
Start: 2019-09-30 | End: 2019-09-30

## 2019-09-30 RX ORDER — VANCOMYCIN HCL 1 G
1000 VIAL (EA) INTRAVENOUS ONCE
Refills: 0 | Status: COMPLETED | OUTPATIENT
Start: 2019-09-30 | End: 2019-09-30

## 2019-09-30 RX ORDER — CEFEPIME 1 G/1
2000 INJECTION, POWDER, FOR SOLUTION INTRAMUSCULAR; INTRAVENOUS ONCE
Refills: 0 | Status: COMPLETED | OUTPATIENT
Start: 2019-09-30 | End: 2019-09-30

## 2019-10-01 ENCOUNTER — INPATIENT (INPATIENT)
Facility: HOSPITAL | Age: 79
LOS: 1 days | Discharge: HOSPICE | DRG: 951 | End: 2019-10-03
Attending: INTERNAL MEDICINE | Admitting: INTERNAL MEDICINE
Payer: MEDICARE

## 2019-10-01 VITALS
RESPIRATION RATE: 22 BRPM | DIASTOLIC BLOOD PRESSURE: 54 MMHG | SYSTOLIC BLOOD PRESSURE: 95 MMHG | HEART RATE: 109 BPM | OXYGEN SATURATION: 100 %

## 2019-10-01 VITALS
OXYGEN SATURATION: 99 % | TEMPERATURE: 98 F | HEART RATE: 98 BPM | DIASTOLIC BLOOD PRESSURE: 59 MMHG | RESPIRATION RATE: 20 BRPM | SYSTOLIC BLOOD PRESSURE: 104 MMHG

## 2019-10-01 DIAGNOSIS — R06.00 DYSPNEA, UNSPECIFIED: ICD-10-CM

## 2019-10-01 DIAGNOSIS — K83.09 OTHER CHOLANGITIS: ICD-10-CM

## 2019-10-01 DIAGNOSIS — C22.9 MALIGNANT NEOPLASM OF LIVER, NOT SPECIFIED AS PRIMARY OR SECONDARY: ICD-10-CM

## 2019-10-01 DIAGNOSIS — E78.5 HYPERLIPIDEMIA, UNSPECIFIED: ICD-10-CM

## 2019-10-01 DIAGNOSIS — C80.1 MALIGNANT (PRIMARY) NEOPLASM, UNSPECIFIED: ICD-10-CM

## 2019-10-01 DIAGNOSIS — A41.9 SEPSIS, UNSPECIFIED ORGANISM: ICD-10-CM

## 2019-10-01 DIAGNOSIS — I10 ESSENTIAL (PRIMARY) HYPERTENSION: ICD-10-CM

## 2019-10-01 DIAGNOSIS — K21.9 GASTRO-ESOPHAGEAL REFLUX DISEASE WITHOUT ESOPHAGITIS: ICD-10-CM

## 2019-10-01 DIAGNOSIS — Z51.5 ENCOUNTER FOR PALLIATIVE CARE: ICD-10-CM

## 2019-10-01 PROBLEM — Z78.9 OTHER SPECIFIED HEALTH STATUS: Chronic | Status: ACTIVE | Noted: 2019-09-24

## 2019-10-01 LAB
ALBUMIN SERPL ELPH-MCNC: 1.4 G/DL — LOW (ref 3.3–5)
ALBUMIN SERPL ELPH-MCNC: 2.3 G/DL — LOW (ref 3.3–5.2)
ALP SERPL-CCNC: 922 U/L — HIGH (ref 40–120)
ALP SERPL-CCNC: 942 U/L — HIGH (ref 40–120)
ALT FLD-CCNC: 110 U/L — HIGH (ref 12–78)
ALT FLD-CCNC: 180 U/L — HIGH
ANION GAP SERPL CALC-SCNC: 21 MMOL/L — HIGH (ref 5–17)
ANION GAP SERPL CALC-SCNC: 24 MMOL/L — HIGH (ref 5–17)
ANISOCYTOSIS BLD QL: SLIGHT — SIGNIFICANT CHANGE UP
APTT BLD: 35.3 SEC — SIGNIFICANT CHANGE UP (ref 27.5–36.3)
APTT BLD: 37.5 SEC — HIGH (ref 27.5–36.3)
AST SERPL-CCNC: 291 U/L — HIGH (ref 15–37)
AST SERPL-CCNC: 554 U/L — HIGH
BASOPHILS # BLD AUTO: 0 K/UL — SIGNIFICANT CHANGE UP (ref 0–0.2)
BASOPHILS NFR BLD AUTO: 0 % — SIGNIFICANT CHANGE UP (ref 0–2)
BILIRUB SERPL-MCNC: 6.3 MG/DL — HIGH (ref 0.2–1.2)
BILIRUB SERPL-MCNC: 7.5 MG/DL — HIGH (ref 0.4–2)
BLD GP AB SCN SERPL QL: SIGNIFICANT CHANGE UP
BUN SERPL-MCNC: 22 MG/DL — SIGNIFICANT CHANGE UP (ref 7–23)
BUN SERPL-MCNC: 26 MG/DL — HIGH (ref 8–20)
CALCIUM SERPL-MCNC: 7.7 MG/DL — LOW (ref 8.5–10.1)
CALCIUM SERPL-MCNC: 7.7 MG/DL — LOW (ref 8.6–10.2)
CHLORIDE SERPL-SCNC: 105 MMOL/L — SIGNIFICANT CHANGE UP (ref 96–108)
CHLORIDE SERPL-SCNC: 99 MMOL/L — SIGNIFICANT CHANGE UP (ref 98–107)
CO2 SERPL-SCNC: 11 MMOL/L — LOW (ref 22–31)
CO2 SERPL-SCNC: 12 MMOL/L — LOW (ref 22–29)
CREAT SERPL-MCNC: 1.65 MG/DL — HIGH (ref 0.5–1.3)
CREAT SERPL-MCNC: 1.94 MG/DL — HIGH (ref 0.5–1.3)
DOHLE BOD BLD QL SMEAR: PRESENT — SIGNIFICANT CHANGE UP
E COLI DNA BLD POS QL NAA+NON-PROBE: SIGNIFICANT CHANGE UP
EOSINOPHIL # BLD AUTO: 0 K/UL — SIGNIFICANT CHANGE UP (ref 0–0.5)
EOSINOPHIL NFR BLD AUTO: 0 % — SIGNIFICANT CHANGE UP (ref 0–6)
GLUCOSE SERPL-MCNC: 76 MG/DL — SIGNIFICANT CHANGE UP (ref 70–99)
GLUCOSE SERPL-MCNC: 99 MG/DL — SIGNIFICANT CHANGE UP (ref 70–115)
GRAM STN FLD: SIGNIFICANT CHANGE UP
GRAM STN FLD: SIGNIFICANT CHANGE UP
HCT VFR BLD CALC: 24.5 % — LOW (ref 34.5–45)
HCT VFR BLD CALC: 26 % — LOW (ref 34.5–45)
HGB BLD-MCNC: 8.1 G/DL — LOW (ref 11.5–15.5)
HGB BLD-MCNC: 8.6 G/DL — LOW (ref 11.5–15.5)
HYPOCHROMIA BLD QL: SLIGHT — SIGNIFICANT CHANGE UP
INR BLD: 1.83 RATIO — HIGH (ref 0.88–1.16)
INR BLD: 1.97 RATIO — HIGH (ref 0.88–1.16)
LACTATE BLDV-MCNC: 8.7 MMOL/L — CRITICAL HIGH (ref 0.5–2)
LACTATE SERPL-SCNC: 11.9 MMOL/L — CRITICAL HIGH (ref 0.7–2)
LYMPHOCYTES # BLD AUTO: 0 % — LOW (ref 13–44)
LYMPHOCYTES # BLD AUTO: 0 K/UL — LOW (ref 1–3.3)
LYMPHOCYTES # BLD AUTO: 2 % — LOW (ref 13–44)
MCHC RBC-ENTMCNC: 28.8 PG — SIGNIFICANT CHANGE UP (ref 27–34)
MCHC RBC-ENTMCNC: 29.2 PG — SIGNIFICANT CHANGE UP (ref 27–34)
MCHC RBC-ENTMCNC: 33.1 GM/DL — SIGNIFICANT CHANGE UP (ref 32–36)
MCHC RBC-ENTMCNC: 33.1 GM/DL — SIGNIFICANT CHANGE UP (ref 32–36)
MCV RBC AUTO: 87.2 FL — SIGNIFICANT CHANGE UP (ref 80–100)
MCV RBC AUTO: 88.1 FL — SIGNIFICANT CHANGE UP (ref 80–100)
METAMYELOCYTES # FLD: 5 % — HIGH (ref 0–0)
METHOD TYPE: SIGNIFICANT CHANGE UP
MONOCYTES # BLD AUTO: 0.19 K/UL — SIGNIFICANT CHANGE UP (ref 0–0.9)
MONOCYTES NFR BLD AUTO: 1 % — LOW (ref 2–14)
MONOCYTES NFR BLD AUTO: 4 % — SIGNIFICANT CHANGE UP (ref 2–14)
MYELOCYTES NFR BLD: 3 % — HIGH (ref 0–0)
NEUTROPHILS # BLD AUTO: 18.14 K/UL — HIGH (ref 1.8–7.4)
NEUTROPHILS NFR BLD AUTO: 75 % — SIGNIFICANT CHANGE UP (ref 43–77)
NEUTROPHILS NFR BLD AUTO: 92 % — HIGH (ref 43–77)
NEUTS BAND # BLD: 10 % — HIGH (ref 0–8)
NRBC # BLD: 1 /100 — HIGH (ref 0–0)
NRBC # BLD: SIGNIFICANT CHANGE UP /100 WBCS (ref 0–0)
OVALOCYTES BLD QL SMEAR: SLIGHT — SIGNIFICANT CHANGE UP
PLAT MORPH BLD: ABNORMAL
PLATELET # BLD AUTO: 105 K/UL — LOW (ref 150–400)
PLATELET # BLD AUTO: 99 K/UL — LOW (ref 150–400)
POIKILOCYTOSIS BLD QL AUTO: SLIGHT — SIGNIFICANT CHANGE UP
POLYCHROMASIA BLD QL SMEAR: SLIGHT — SIGNIFICANT CHANGE UP
POTASSIUM SERPL-MCNC: 2.7 MMOL/L — CRITICAL LOW (ref 3.5–5.3)
POTASSIUM SERPL-MCNC: 3.1 MMOL/L — LOW (ref 3.5–5.3)
POTASSIUM SERPL-SCNC: 2.7 MMOL/L — CRITICAL LOW (ref 3.5–5.3)
POTASSIUM SERPL-SCNC: 3.1 MMOL/L — LOW (ref 3.5–5.3)
PROT SERPL-MCNC: 5 GM/DL — LOW (ref 6–8.3)
PROT SERPL-MCNC: 5.4 G/DL — LOW (ref 6.6–8.7)
PROTHROM AB SERPL-ACNC: 21.5 SEC — HIGH (ref 10–12.9)
PROTHROM AB SERPL-ACNC: 22.3 SEC — HIGH (ref 10–12.9)
RBC # BLD: 2.81 M/UL — LOW (ref 3.8–5.2)
RBC # BLD: 2.95 M/UL — LOW (ref 3.8–5.2)
RBC # FLD: 15.7 % — HIGH (ref 10.3–14.5)
RBC # FLD: 16 % — HIGH (ref 10.3–14.5)
RBC BLD AUTO: ABNORMAL
SODIUM SERPL-SCNC: 135 MMOL/L — SIGNIFICANT CHANGE UP (ref 135–145)
SODIUM SERPL-SCNC: 137 MMOL/L — SIGNIFICANT CHANGE UP (ref 135–145)
TOXIC GRANULES BLD QL SMEAR: PRESENT — SIGNIFICANT CHANGE UP
VARIANT LYMPHS # BLD: 1 % — SIGNIFICANT CHANGE UP (ref 0–6)
WBC # BLD: 18.51 K/UL — HIGH (ref 3.8–10.5)
WBC # BLD: 34.9 K/UL — HIGH (ref 3.8–10.5)
WBC # FLD AUTO: 18.51 K/UL — HIGH (ref 3.8–10.5)
WBC # FLD AUTO: 34.9 K/UL — HIGH (ref 3.8–10.5)

## 2019-10-01 PROCEDURE — 99497 ADVNCD CARE PLAN 30 MIN: CPT

## 2019-10-01 PROCEDURE — 99291 CRITICAL CARE FIRST HOUR: CPT | Mod: 25

## 2019-10-01 PROCEDURE — 99222 1ST HOSP IP/OBS MODERATE 55: CPT

## 2019-10-01 PROCEDURE — 76705 ECHO EXAM OF ABDOMEN: CPT | Mod: 26

## 2019-10-01 PROCEDURE — 36556 INSERT NON-TUNNEL CV CATH: CPT

## 2019-10-01 PROCEDURE — 71045 X-RAY EXAM CHEST 1 VIEW: CPT | Mod: 26

## 2019-10-01 PROCEDURE — 99223 1ST HOSP IP/OBS HIGH 75: CPT

## 2019-10-01 PROCEDURE — 99498 ADVNCD CARE PLAN ADDL 30 MIN: CPT

## 2019-10-01 RX ORDER — HYDROMORPHONE HYDROCHLORIDE 2 MG/ML
1 INJECTION INTRAMUSCULAR; INTRAVENOUS; SUBCUTANEOUS EVERY 4 HOURS
Refills: 0 | Status: DISCONTINUED | OUTPATIENT
Start: 2019-10-01 | End: 2019-10-01

## 2019-10-01 RX ORDER — PIPERACILLIN AND TAZOBACTAM 4; .5 G/20ML; G/20ML
3.38 INJECTION, POWDER, LYOPHILIZED, FOR SOLUTION INTRAVENOUS EVERY 8 HOURS
Refills: 0 | Status: DISCONTINUED | OUTPATIENT
Start: 2019-10-01 | End: 2019-10-01

## 2019-10-01 RX ORDER — PIPERACILLIN AND TAZOBACTAM 4; .5 G/20ML; G/20ML
3.38 INJECTION, POWDER, LYOPHILIZED, FOR SOLUTION INTRAVENOUS ONCE
Refills: 0 | Status: DISCONTINUED | OUTPATIENT
Start: 2019-10-01 | End: 2019-10-01

## 2019-10-01 RX ORDER — POTASSIUM CHLORIDE 20 MEQ
10 PACKET (EA) ORAL
Refills: 0 | Status: COMPLETED | OUTPATIENT
Start: 2019-10-01 | End: 2019-10-01

## 2019-10-01 RX ORDER — SODIUM CHLORIDE 9 MG/ML
1000 INJECTION, SOLUTION INTRAVENOUS ONCE
Refills: 0 | Status: COMPLETED | OUTPATIENT
Start: 2019-10-01 | End: 2019-10-01

## 2019-10-01 RX ORDER — FENTANYL CITRATE 50 UG/ML
50 INJECTION INTRAVENOUS ONCE
Refills: 0 | Status: DISCONTINUED | OUTPATIENT
Start: 2019-10-01 | End: 2019-10-01

## 2019-10-01 RX ORDER — HYDROMORPHONE HYDROCHLORIDE 2 MG/ML
0.5 INJECTION INTRAMUSCULAR; INTRAVENOUS; SUBCUTANEOUS EVERY 4 HOURS
Refills: 0 | Status: DISCONTINUED | OUTPATIENT
Start: 2019-10-01 | End: 2019-10-03

## 2019-10-01 RX ORDER — PIPERACILLIN AND TAZOBACTAM 4; .5 G/20ML; G/20ML
3.38 INJECTION, POWDER, LYOPHILIZED, FOR SOLUTION INTRAVENOUS ONCE
Refills: 0 | Status: COMPLETED | OUTPATIENT
Start: 2019-10-01 | End: 2019-10-01

## 2019-10-01 RX ORDER — NOREPINEPHRINE BITARTRATE/D5W 8 MG/250ML
0.5 PLASTIC BAG, INJECTION (ML) INTRAVENOUS
Qty: 8 | Refills: 0 | Status: DISCONTINUED | OUTPATIENT
Start: 2019-10-01 | End: 2019-10-01

## 2019-10-01 RX ORDER — MAGNESIUM SULFATE 500 MG/ML
2 VIAL (ML) INJECTION ONCE
Refills: 0 | Status: COMPLETED | OUTPATIENT
Start: 2019-10-01 | End: 2019-10-01

## 2019-10-01 RX ORDER — POTASSIUM CHLORIDE 20 MEQ
10 PACKET (EA) ORAL
Refills: 0 | Status: DISCONTINUED | OUTPATIENT
Start: 2019-10-01 | End: 2019-10-01

## 2019-10-01 RX ORDER — HYDROMORPHONE HYDROCHLORIDE 2 MG/ML
1 INJECTION INTRAMUSCULAR; INTRAVENOUS; SUBCUTANEOUS
Refills: 0 | Status: DISCONTINUED | OUTPATIENT
Start: 2019-10-01 | End: 2019-10-03

## 2019-10-01 RX ORDER — ROBINUL 0.2 MG/ML
0.4 INJECTION INTRAMUSCULAR; INTRAVENOUS EVERY 6 HOURS
Refills: 0 | Status: DISCONTINUED | OUTPATIENT
Start: 2019-10-01 | End: 2019-10-03

## 2019-10-01 RX ORDER — PIPERACILLIN AND TAZOBACTAM 4; .5 G/20ML; G/20ML
INJECTION, POWDER, LYOPHILIZED, FOR SOLUTION INTRAVENOUS
Refills: 0 | Status: DISCONTINUED | OUTPATIENT
Start: 2019-10-01 | End: 2019-10-01

## 2019-10-01 RX ORDER — NOREPINEPHRINE BITARTRATE/D5W 8 MG/250ML
0.05 PLASTIC BAG, INJECTION (ML) INTRAVENOUS
Qty: 8 | Refills: 0 | Status: DISCONTINUED | OUTPATIENT
Start: 2019-10-01 | End: 2019-10-01

## 2019-10-01 RX ORDER — SODIUM CHLORIDE 9 MG/ML
1000 INJECTION, SOLUTION INTRAVENOUS
Refills: 0 | Status: DISCONTINUED | OUTPATIENT
Start: 2019-10-01 | End: 2019-10-01

## 2019-10-01 RX ORDER — SODIUM CHLORIDE 9 MG/ML
1000 INJECTION INTRAMUSCULAR; INTRAVENOUS; SUBCUTANEOUS ONCE
Refills: 0 | Status: COMPLETED | OUTPATIENT
Start: 2019-10-01 | End: 2019-10-01

## 2019-10-01 RX ADMIN — Medication 100 MILLIEQUIVALENT(S): at 06:34

## 2019-10-01 RX ADMIN — Medication 5.95 MICROGRAM(S)/KG/MIN: at 00:57

## 2019-10-01 RX ADMIN — Medication 65.91 MICROGRAM(S)/KG/MIN: at 06:35

## 2019-10-01 RX ADMIN — SODIUM CHLORIDE 1950 MILLILITER(S): 9 INJECTION, SOLUTION INTRAVENOUS at 02:38

## 2019-10-01 RX ADMIN — Medication 100 MILLIEQUIVALENT(S): at 01:38

## 2019-10-01 RX ADMIN — Medication 50 GRAM(S): at 06:35

## 2019-10-01 RX ADMIN — Medication 100 MILLIEQUIVALENT(S): at 03:34

## 2019-10-01 RX ADMIN — HYDROMORPHONE HYDROCHLORIDE 0.5 MILLIGRAM(S): 2 INJECTION INTRAMUSCULAR; INTRAVENOUS; SUBCUTANEOUS at 14:49

## 2019-10-01 RX ADMIN — FENTANYL CITRATE 50 MICROGRAM(S): 50 INJECTION INTRAVENOUS at 06:30

## 2019-10-01 RX ADMIN — HYDROMORPHONE HYDROCHLORIDE 0.5 MILLIGRAM(S): 2 INJECTION INTRAMUSCULAR; INTRAVENOUS; SUBCUTANEOUS at 14:41

## 2019-10-01 RX ADMIN — Medication 0.5 MILLIGRAM(S): at 18:21

## 2019-10-01 RX ADMIN — PIPERACILLIN AND TAZOBACTAM 200 GRAM(S): 4; .5 INJECTION, POWDER, LYOPHILIZED, FOR SOLUTION INTRAVENOUS at 14:12

## 2019-10-01 RX ADMIN — HYDROMORPHONE HYDROCHLORIDE 0.5 MILLIGRAM(S): 2 INJECTION INTRAMUSCULAR; INTRAVENOUS; SUBCUTANEOUS at 22:59

## 2019-10-01 RX ADMIN — HYDROMORPHONE HYDROCHLORIDE 0.5 MILLIGRAM(S): 2 INJECTION INTRAMUSCULAR; INTRAVENOUS; SUBCUTANEOUS at 21:59

## 2019-10-01 RX ADMIN — CEFEPIME 100 MILLIGRAM(S): 1 INJECTION, POWDER, FOR SOLUTION INTRAMUSCULAR; INTRAVENOUS at 00:15

## 2019-10-01 RX ADMIN — HYDROMORPHONE HYDROCHLORIDE 1 MILLIGRAM(S): 2 INJECTION INTRAMUSCULAR; INTRAVENOUS; SUBCUTANEOUS at 11:35

## 2019-10-01 RX ADMIN — Medication 250 MILLIGRAM(S): at 00:49

## 2019-10-01 RX ADMIN — SODIUM CHLORIDE 1000 MILLILITER(S): 9 INJECTION, SOLUTION INTRAVENOUS at 06:12

## 2019-10-01 RX ADMIN — SODIUM CHLORIDE 1000 MILLILITER(S): 9 INJECTION INTRAMUSCULAR; INTRAVENOUS; SUBCUTANEOUS at 01:00

## 2019-10-01 RX ADMIN — CEFEPIME 2000 MILLIGRAM(S): 1 INJECTION, POWDER, FOR SOLUTION INTRAMUSCULAR; INTRAVENOUS at 00:41

## 2019-10-01 RX ADMIN — HYDROMORPHONE HYDROCHLORIDE 1 MILLIGRAM(S): 2 INJECTION INTRAMUSCULAR; INTRAVENOUS; SUBCUTANEOUS at 11:14

## 2019-10-01 RX ADMIN — Medication 1000 MILLIGRAM(S): at 02:00

## 2019-10-01 RX ADMIN — Medication 0.5 MILLIGRAM(S): at 13:59

## 2019-10-01 RX ADMIN — SODIUM CHLORIDE 1000 MILLILITER(S): 9 INJECTION, SOLUTION INTRAVENOUS at 07:28

## 2019-10-01 RX ADMIN — SODIUM CHLORIDE 1000 MILLILITER(S): 9 INJECTION INTRAMUSCULAR; INTRAVENOUS; SUBCUTANEOUS at 00:40

## 2019-10-01 RX ADMIN — Medication 10 MILLIEQUIVALENT(S): at 02:41

## 2019-10-01 RX ADMIN — SODIUM CHLORIDE 1950 MILLILITER(S): 9 INJECTION, SOLUTION INTRAVENOUS at 01:37

## 2019-10-01 NOTE — ED ADULT NURSE REASSESSMENT NOTE - NS ED NURSE REASSESS COMMENT FT1
pt placed on bipap for respiratory distress. pt developed audible wheeze. Therefore, pt only received 1 full liter of LR, not the ordered dose. pt currently being transferred by [facility] to [place]. bedside report given to EMS.

## 2019-10-01 NOTE — CONSULT NOTE ADULT - PROBLEM SELECTOR RECOMMENDATION 9
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 Patient and her family has opted to forego anti neoplastic treatment and focus on comfort. poor prognosis overall.

## 2019-10-01 NOTE — CHART NOTE - NSCHARTNOTEFT_GEN_A_CORE
Called at 20:30. Pts brother Francis wants to speak to the Dr about his sister being comfort care. Pt made comfort care today by pts own wishes. .Dr Carey  Discussed with son in [facility] via  for 1.5 hrs today.  Pt is comfort care. Brother Francis wanted pt on monitor. I Spoke with brother for an hour , offered support and answered questions. Discovered  that Brother wanted pt on cardiac monitor so we can see when she dies. Pt educated, support given. Brother now understands. Pt is still comfort care

## 2019-10-01 NOTE — ED ADULT NURSE NOTE - CHIEF COMPLAINT QUOTE
[place] transfer for ERCP. presented to [place] for "shakes" as per family, hypotensive upon arrival. Hx of Hepatobiliary CA Dx 2 weeks ago. lactate of 11.9 and K of 2.7.   Medicated with 3 liters of fluids, 2 K riders, Vancomycin and cefepime. on BiPAP A&Ox3 as per [place] report with Levophed running at 0.06mcg/kg/min.  brought to  upon arrival to [facility] for workup

## 2019-10-01 NOTE — ED ADULT TRIAGE NOTE - CHIEF COMPLAINT QUOTE
[facility] transfer for ERCP. presented to [facility] for "shakes" as per family, hypotensive upon arrival. Hx of Hepatobiliary CA Dx 2 weeks ago. lactate of 11.9 and K of 2.7.   Medicated with 3 liters of fluids, 2 K riders, Vancomycin and cefepime. on BiPAP A&Ox3 as per [facility] report with Levophed running at 0.06mcg/kg/min.  brought to  upon arrival to [facility] for workup

## 2019-10-01 NOTE — ED ADULT NURSE REASSESSMENT NOTE - NS ED NURSE REASSESS COMMENT FT1
Resumed care at 0721.  Patient resting comfortably in stretcher,  awake alert, and oriented times 3, breathing unlabored.  Cardiac monitor and pulse ox continued.  LR and levo infusing without difficulty.  IV noted to 22g left inner forearm, and 22g right inner wrist, no redness or swelling.  flushes without difficulty.  triple lumen central line noted to right groin, no redness or swelling.  Awaiting plan of care and bed assignment.  Will continue to monitor.  oxygen continued.

## 2019-10-01 NOTE — ED ADULT NURSE NOTE - NSIMPLEMENTINTERV_GEN_ALL_ED
Implemented All Fall Risk Interventions:  [place] to call system. Call bell, personal items and telephone within reach. Instruct patient to call for assistance. Room bathroom lighting operational. Non-slip footwear when patient is off stretcher. Physically safe environment: no spills, clutter or unnecessary equipment. Stretcher in lowest position, wheels locked, appropriate side rails in place. Provide visual cue, wrist band, yellow gown, etc. Monitor gait and stability. Monitor for mental status changes and reorient to person, place, and time. Review medications for side effects contributing to fall risk. Reinforce activity limits and safety measures with patient and family.

## 2019-10-01 NOTE — ED ADULT NURSE NOTE - OBJECTIVE STATEMENT
assumed pt care as critical care RN, helicopter transfer from [facility]. Dr Grimes and code team at bedside upon arrival. pt A+Ox3, drowsy but arousable. 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 pt at  found to be hypotensive, placed on BiPAP for resp distress, received @ [facility] on BiPAP 30% FiO2. pt present with RCW port accessed this AM @ . pt with 16Fr indwelling carcamo in place since this AM, b/l 22g IVs, flushing well. pt received on levophed gtt @ 0.3mcg/kg/hr.  pt sent to [facility] for needed ERCP. nonpitting edema present to b/l MALAIKA. pt skin warm/dry/intact, jaundiced. pt placed on cardiac monitor and  upon arrival. all safety measures maintained.

## 2019-10-01 NOTE — CONSULT NOTE ADULT - SUBJECTIVE AND OBJECTIVE BOX
HPI: 79F with PMH as listed admitted today transferred from [facility] for possible ERCP. She was found to have fevers/rigors, confusion, septic shock related to cholangitis. Patient just diagnosed with advanced hepatobiliary cancer at [facility], sent here for ERCP. After discussion with ICU attending, patient and family has opted for comfort measures.     Admitted to [facility] on 9/21. 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 s/p Port placement on 9/26.     PERTINENT PMH REVIEWED: Yes     PAST MEDICAL & SURGICAL HISTORY:  No significant past surgical history    SOCIAL HISTORY:  no drug use                                    Admitted from:  outside hospital - [facility]     Surrogate - brother Francis. 2 sons - one in [place], and 1 in [facility].     FAMILY HISTORY:  No pertinent family history in first degree relatives    Baseline ADLs (prior to admission):  Independent     Allergies    No Known Allergies    Present Symptoms:     Dyspnea: 2   Nausea/Vomiting: No  Anxiety:  No  Depression: No  Fatigue: Yes   Loss of appetite: No    Pain: none currently             Character-            Duration-            Effect-            Factors-            Frequency-            Location-            Severity-    Review of Systems: Reviewed                                     Positive: shortness of breath   All others negative    MEDICATIONS  (STANDING):  HYDROmorphone  Injectable 0.5 milliGRAM(s) IV Push every 4 hours  LORazepam   Injectable 0.5 milliGRAM(s) IV Push every 6 hours    MEDICATIONS  (PRN):  glycopyrrolate Injectable 0.4 milliGRAM(s) IV Push every 6 hours PRN secretions  HYDROmorphone  Injectable 1 milliGRAM(s) IV Push every 2 hours PRN dyspnea  LORazepam   Injectable 1 milliGRAM(s) IV Push every 4 hours PRN respiratory distress/anxiety    PHYSICAL EXAM:    Vital Signs Last 24 Hrs  T(C): 36.4 (01 Oct 2019 07:39), Max: 36.4 (01 Oct 2019 07:39)  T(F): 97.5 (01 Oct 2019 07:39), Max: 97.5 (01 Oct 2019 07:39)  HR: 97 (01 Oct 2019 09:55) (90 - 109)  BP: 71/50 (01 Oct 2019 09:55) (64/40 - 125/56)  BP(mean): 80 (01 Oct 2019 07:43) (67 - 88)  RR: 26 (01 Oct 2019 09:55) (10 - 30)  SpO2: 100% (01 Oct 2019 09:55) (64% - 100%)    General: lethargic and in respiratory distress but able to answer basic questions in Persian.     Karnofsky:  20 %    HEENT: normal      Lungs: tachypnea/labored breathing     CV: normal      GI: normal    : normal      MSK: bedbound/wheelchair bound    Skin: no rash    LABS:                      8.6    34.90 )-----------( 105      ( 01 Oct 2019 06:18 )             26.0     10-01    135  |  99  |  26.0<H>  ----------------------------<  99  3.1<L>   |  12.0<L>  |  1.65<H>    Ca    7.7<L>      01 Oct 2019 06:18  Mg     1.2     10-01    TPro  5.4<L>  /  Alb  2.3<L>  /  TBili  7.5<H>  /  DBili  x   /  AST  554<H>  /  ALT  180<H>  /  AlkPhos  942<H>  10-01    PT/INR - ( 01 Oct 2019 06:18 )   PT: 21.5 sec;   INR: 1.83 ratio       PTT - ( 01 Oct 2019 06:18 )  PTT:35.3 sec    I&O's Summary    RADIOLOGY & ADDITIONAL STUDIES:    CT A/P 9/21 - Large mass involving the right lobe of the liver primarily segment 5. This measures at  least 7 x 8 x 11 cm. The adjacent gallbladder is abnormal in appearance. Findings may represent gallbladder malignancy with extension into the liver, however other sources of malignancy are not excluded. Numerous small satellite lesions are also seen throughout   the liver. Large periportal adenopathy.    CT chest - 9/24 - A suspicious enhancing thyroid lesion, for which ultrasound is recommended for further evaluation if clinically indicated. No definite evidence of metastases  to the chest. Some small indeterminate lung nodules.    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    ADVANCE DIRECTIVES: DNR/I
unfortunate woman with hepatobiliary cancer, recent diagnosis, scheduled for chemo next week, presented to [facility] after home temperature of 104, rigors, confusion, found to have septic shock; received 4 L LR, lactate still markedly elevated, transferred to [facility] for consideration of ERCP.    On discussion with patient and family, wish for hospice.  See GOC note for details.

## 2019-10-01 NOTE — ED PROVIDER NOTE - OBJECTIVE STATEMENT
hepatobiliary/pancreatic cancer with mets to liver 78 yo female with hepatobiliary/pancreatic cancer with mets to liver diagnosed last week tonight pt with generalized weakness, subjective fevers and chills today with new onset of jaundice.

## 2019-10-01 NOTE — ED ADULT NURSE NOTE - OBJECTIVE STATEMENT
pt biba for hypotension. Pt appears lethargic, c/o headache and right sided neck pain from right EJ placed by EMS. pt recently diagnosed with liver ca. pt had port accessed by jerald bradford on right chest wall. single lumen port placed.

## 2019-10-01 NOTE — ED PROVIDER NOTE - CONSTITUTIONAL, MLM
normal... ill appearing, well nourished, awake, alert, oriented to person, place, time/situation and in mild distress. with jaundice and generalized pain

## 2019-10-01 NOTE — H&P ADULT - HISTORY OF PRESENT ILLNESS
78 y/o female was taken to ER at [facility] for fever and confusion. patient was in a septic shock and jaundiced and was resuscitated with 4 liters IVF and Abx. Patient was  transferred from [facility] for ERCP. patient was recently diagnosed with poorly differentiated ca of biliary tract likely gall bladder ca with direct invasion of the liver beside metastasis. in the ER, patient and family was seen by ICU attending and after discussion a decision was made for comfort care. Patient was seen and examined. patient was not in pain, talking and asking to po juice. patient tolerated po juice. patient brother Francis was called who advised : "yes to give Antibiotics and IVF if patient need it".

## 2019-10-01 NOTE — ED ADULT NURSE REASSESSMENT NOTE - NS ED NURSE REASSESS COMMENT FT1
With , patient has no complaints at this time.  Patient rolled to right side and pillows placed to back and under head  to assure comfort.

## 2019-10-01 NOTE — ED PROVIDER NOTE - PMH
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

## 2019-10-01 NOTE — GOALS OF CARE CONVERSATION - ADVANCED CARE PLANNING - CONVERSATION DETAILS
Full discussion regarding goals of care involving brother, son, and patient, in the presence and with the assistance of an .  They understand that she is toward end of life, and with aggressive treatment for septic shock she is unlikely to be strong enough in the next week to withstand the cancer treatment recommended, chemotherapy.    She wants to die in [facility] and wishes to forgo aggressive treatment for septic shock

## 2019-10-01 NOTE — ED PROVIDER NOTE - CLINICAL SUMMARY MEDICAL DECISION MAKING FREE TEXT BOX
septic shock tx from [facility] 2/2 ascending cholangitis central peripheral venous femoral access obtained requiring 0.5 mcg/kg/min levo icu made aware will require medical resuscitation before can entertain ercp. ekg cxr reviewed from [facility]

## 2019-10-01 NOTE — ED ADULT NURSE REASSESSMENT NOTE - NS ED NURSE REASSESS COMMENT FT1
Patient awake alert, and oriented times 3, breathing unlabored. oxygen continued.  Patient has no complaints at this time.  DNR/DRI form signed by Dr. Carey and witnessed by PA.  Patient verbally consented with son on the phone from [place], and patient's brother at bedside.  Comfort measures to be done.  Cardiac monitor and pulse ox continued.  Awaiting additional orders, and bed assignment.  Will continue to monitor.   present at time of consent.

## 2019-10-01 NOTE — CONSULT NOTE ADULT - ATTENDING COMMENTS
coordinating with hospice care network; will not admit to ICU; admit to hospice vs hospitalist based on hospice input.
Thank you for the opportunity to assist with the care of this patient.   [facility] [phone].

## 2019-10-01 NOTE — ED PROVIDER NOTE - PROGRESS NOTE DETAILS
ED administrative note:  seen by ICU this morning and decision made for DNR/DNI, comfort care measures only.  Orders placed by Dr Carey.  Admission changed to Dr Rust. Case d/w hospice medicine for evaluation.

## 2019-10-01 NOTE — H&P ADULT - ASSESSMENT
78 y/o female with hepatobiliary carcinoma complicated by ascending Cholangitis    ·	Zosyn. IVF.  ·	advance clear liquid diet  ·	comfort care/hospice care as per Dr. Graves

## 2019-10-01 NOTE — ED PROVIDER NOTE - CRITICAL CARE PROVIDED
interpretation of diagnostic studies/documentation/additional history taking/consultation with other physicians/consult w/ pt's family directly relating to pts condition/direct patient care (not related to procedure)

## 2019-10-01 NOTE — ED ADULT NURSE REASSESSMENT NOTE - NS ED NURSE REASSESS COMMENT FT1
With , patient has no complaints at this time.  No complaints of pain.  patient states improvement.  family at bedside.  Will continue to monitor

## 2019-10-01 NOTE — ED PROVIDER NOTE - CARE PLAN
Principal Discharge DX:	Liver failure without hepatic coma, unspecified chronicity  Secondary Diagnosis:	Sepsis

## 2019-10-01 NOTE — ED ADULT NURSE NOTE - NS ED NURSE NAME NOTIFICATION
Rest and hydrate with plenty of fluids  1.5 cc of [place] shot given in clinic  Azithromycin prescription  Fill and take this antibiotic prescription if not much improved with shot and cough medication Bromfed DM prescription in 2-3 days  Bromfed DM prescription for cough congestion postnasal drip and runny nose  Rapid strep test negative  600 mg of ibuprofen for sore throat or low-grade temperature  Okay to also take Tylenol 650 mg every 4-6 hours for sore throat and fever.  Return for any concerns or problems.  Follow up with primary care physician or physician at [facility] in [place].    Bronchitis, Antibiotic Treatment (Adult)    Bronchitis is an infection of the air passages (bronchial tubes) in your lungs. It often occurs when you have a cold. This illness is contagious during the first few days and is spread through the air by coughing and sneezing, or by direct contact (touching the sick person and then touching your own eyes, nose, or mouth).  Symptoms of bronchitis include cough with mucus (phlegm) and low-grade fever. Bronchitis usually lasts 7 to 14 days. Mild cases can be treated with simple home remedies. More severe infection is treated with an antibiotic.  Home care  Follow these guidelines when caring for yourself at home:  · If your symptoms are severe, rest at home for the first 2 to 3 days. When you go back to your usual activities, don't let yourself get too tired.  · Do not smoke. Also avoid being exposed to secondhand smoke.  · You may use over-the-counter medicines to control fever or pain, unless another medicine was prescribed. (Note: If you have chronic liver or kidney disease or have ever had a stomach ulcer or gastrointestinal bleeding, talk with your healthcare provider before using these medicines. Also talk to your provider if you are taking medicine to prevent blood clots.) Aspirin should never be given to anyone younger than 18 years of age who is ill with a viral infection 
or fever. It may cause severe liver or brain damage.  · Your appetite may be poor, so a light diet is fine. Avoid dehydration by drinking 6 to 8 glasses of fluids per day (such as water, soft drinks, sports drinks, juices, tea, or soup). Extra fluids will help loosen secretions in the nose and lungs.  · Over-the-counter cough, cold, and sore-throat medicines will not shorten the length of the illness, but they may be helpful to reduce symptoms. (Note: Do not use decongestants if you have high blood pressure.)  · Finish all antibiotic medicine. Do this even if you are feeling better after only a few days.  Follow-up care  Follow up with your healthcare provider, or as advised. If you had an X-ray or ECG (electrocardiogram), a specialist will review it. You will be notified of any new findings that may affect your care.  Note: If you are age 65 or older, or if you have a chronic lung disease or condition that affects your immune system, or you smoke, talk to your healthcare provider about having pneumococcal vaccinations and a yearly influenza vaccination (flu shot).  When to seek medical advice  Call your healthcare provider right away if any of these occur:  · Fever of 100.4°F (38°C) or higher  · Coughing up increased amounts of colored sputum  · Weakness, drowsiness, headache, facial pain, ear pain, or a stiff neck  Call 911, or get immediate medical care  Contact emergency services right away if any of these occur.  · Coughing up blood  · Worsening weakness, drowsiness, headache, or stiff neck  · Trouble breathing, wheezing, or pain with breathing  Date Last Reviewed: 9/13/2015 © 2000-2017 [employer]. [address] [place] [id number]. All rights reserved. This information is not intended as a substitute for professional medical care. Always follow your healthcare professional's instructions.        
Rogelio Kinsey(Attending)
brother @bedside

## 2019-10-01 NOTE — ED PROVIDER NOTE - CRITICAL CARE PROVIDED
interpretation of diagnostic studies/consultation with other physicians/documentation/additional history taking/consult w/ pt's family directly relating to pts condition/direct patient care (not related to procedure)

## 2019-10-01 NOTE — ED ADULT NURSE NOTE - CADM POA PRESS ULCER
[facility] called in to advise the service requested for patient is not offered at any of their locations No

## 2019-10-01 NOTE — CONSULT NOTE ADULT - PROBLEM SELECTOR RECOMMENDATION 4
- spoke with patient briefly, she was not in a state to be able to have meaningful conversation with me. Spoke to brother Francis and sister in law. They understand guarded prognosis, they have spoken to one of patient's sons in [place] and patient herself decided this AM on comfort measures and no further pursuit of antineoplastic therapy. Symptom medications ordered. Patient is not hemodynamically stable for transfer to a hospice facility and we do not admit patients to hospice as floating beds here at [place] any longer.

## 2019-10-01 NOTE — ED PROVIDER NOTE - PROGRESS NOTE DETAILS
icu called KRISHNA Bhatia DO case dw Dr. Quarles, who spole with Dr. Leija, GI they recommend transfer to [place] for possible emergent ercp. will call transfer center. Pt is currently on erma Bhatia DO transfer center called for [facility] for emergent ercp KRISHNA Bhatia DO spoke with Dr. Pena from [place] spoke with pt again using translaotr phone, pt wants everything done, intubation, cpr, medications. pt was placed on bipap for probable chf due to fluid administartion . called back and spoke with transfer center pt to be transferred KRISHNA Bhatia DO

## 2019-10-01 NOTE — ED ADULT NURSE NOTE - NSIMPLEMENTINTERV_GEN_ALL_ED
Implemented All Fall Risk Interventions:  [place] to call system. Call bell, personal items and telephone within reach. Instruct patient to call for assistance. Room bathroom lighting operational. Non-slip footwear when patient is off stretcher. Physically safe environment: no spills, clutter or unnecessary equipment. Stretcher in lowest position, wheels locked, appropriate side rails in place. Provide visual cue, wrist band, yellow gown, etc. Monitor gait and stability. Monitor for mental status changes and reorient to person, place, and time. Review medications for side effects contributing to fall risk. Reinforce activity limits and safety measures with patient and family.

## 2019-10-01 NOTE — PROVIDER CONTACT NOTE (OTHER) - SITUATION
pt was transferred from [facility] this am and these results are from prior to transfer here SS informed from DAISY GOMEZ @ [facility]

## 2019-10-01 NOTE — PROVIDER CONTACT NOTE (OTHER) - REASON
pt has positive blood cultures  gm pos rods in aerobic and anerobic  bottles A [facility]

## 2019-10-01 NOTE — ED ADULT NURSE REASSESSMENT NOTE - NS ED NURSE REASSESS COMMENT FT1
Brother Francis called and notified to come back to [place] to be with sister.  Brother on way back to hospital

## 2019-10-01 NOTE — CONSULT NOTE ADULT - ASSESSMENT
79F just diagnosed with hepatobiliary cancer, admitted here for septic shock related to cholangitis, kidney failure, now on comfort care.

## 2019-10-01 NOTE — ED ADULT NURSE REASSESSMENT NOTE - NS ED NURSE REASSESS COMMENT FT1
indwelling carcamo catheter fro HH removed at this time, 16Fr temp sensing carcamo placed for accurate temp measurement. pt remains on cardiac monitor and ,  at bedside. pt in no apparent distress. indwelling carcamo catheter fro HH removed at this time, 16Fr temp sensing carcamo placed for accurate temp measurement. pt remains on cardiac monitor and , brother at bedside. pt in no apparent distress.

## 2019-10-01 NOTE — CHART NOTE - NSCHARTNOTEFT_GEN_A_CORE
Called to evaluate patient for septic shock  Concern for cholangitis - abdominal pain, jaundice, elevated bilirubin, elevated AST/ALT, bile duct dilation on US  Discussed case with Dr. Leija. Recommended transfer to outside facility like [facility] for possible ERCP

## 2019-10-01 NOTE — ED POST DISCHARGE NOTE - RESULT SUMMARY
+blood culture in both aerobic and anaerobic bottles for GNR. FABI Guerrero at [place] made aware, states she will inform physician staff. - VINCENT ColladoC

## 2019-10-01 NOTE — ED PROCEDURE NOTE - CPROC ED INFUS LINE DETAIL1
Ultrasound guidance was used during placement./The catheter was placed using sterile technique./The guidewire was recovered./The location was identified, and the area was draped and prepped./All lumen(s) aspirated and flushed without difficulty.

## 2019-10-01 NOTE — ED ADULT NURSE REASSESSMENT NOTE - NS ED NURSE REASSESS COMMENT FT1
Levophed discontinued as per additional orders placed.  Comfort measures to be done.  No complaints at this time as per patient.

## 2019-10-02 VITALS
DIASTOLIC BLOOD PRESSURE: 41 MMHG | RESPIRATION RATE: 20 BRPM | HEART RATE: 80 BPM | OXYGEN SATURATION: 99 % | SYSTOLIC BLOOD PRESSURE: 70 MMHG | TEMPERATURE: 97 F

## 2019-10-02 DIAGNOSIS — C23 MALIGNANT NEOPLASM OF GALLBLADDER: ICD-10-CM

## 2019-10-02 PROCEDURE — 99232 SBSQ HOSP IP/OBS MODERATE 35: CPT

## 2019-10-02 PROCEDURE — 99233 SBSQ HOSP IP/OBS HIGH 50: CPT

## 2019-10-02 RX ADMIN — Medication 0.5 MILLIGRAM(S): at 01:07

## 2019-10-02 RX ADMIN — HYDROMORPHONE HYDROCHLORIDE 0.5 MILLIGRAM(S): 2 INJECTION INTRAMUSCULAR; INTRAVENOUS; SUBCUTANEOUS at 16:21

## 2019-10-02 RX ADMIN — HYDROMORPHONE HYDROCHLORIDE 0.5 MILLIGRAM(S): 2 INJECTION INTRAMUSCULAR; INTRAVENOUS; SUBCUTANEOUS at 11:10

## 2019-10-02 RX ADMIN — HYDROMORPHONE HYDROCHLORIDE 0.5 MILLIGRAM(S): 2 INJECTION INTRAMUSCULAR; INTRAVENOUS; SUBCUTANEOUS at 21:13

## 2019-10-02 RX ADMIN — HYDROMORPHONE HYDROCHLORIDE 0.5 MILLIGRAM(S): 2 INJECTION INTRAMUSCULAR; INTRAVENOUS; SUBCUTANEOUS at 07:55

## 2019-10-02 RX ADMIN — HYDROMORPHONE HYDROCHLORIDE 0.5 MILLIGRAM(S): 2 INJECTION INTRAMUSCULAR; INTRAVENOUS; SUBCUTANEOUS at 16:35

## 2019-10-02 RX ADMIN — Medication 0.5 MILLIGRAM(S): at 17:49

## 2019-10-02 RX ADMIN — HYDROMORPHONE HYDROCHLORIDE 0.5 MILLIGRAM(S): 2 INJECTION INTRAMUSCULAR; INTRAVENOUS; SUBCUTANEOUS at 22:13

## 2019-10-02 RX ADMIN — HYDROMORPHONE HYDROCHLORIDE 0.5 MILLIGRAM(S): 2 INJECTION INTRAMUSCULAR; INTRAVENOUS; SUBCUTANEOUS at 07:35

## 2019-10-02 RX ADMIN — Medication 0.5 MILLIGRAM(S): at 06:01

## 2019-10-02 RX ADMIN — HYDROMORPHONE HYDROCHLORIDE 0.5 MILLIGRAM(S): 2 INJECTION INTRAMUSCULAR; INTRAVENOUS; SUBCUTANEOUS at 03:19

## 2019-10-02 RX ADMIN — Medication 0.5 MILLIGRAM(S): at 12:54

## 2019-10-02 RX ADMIN — HYDROMORPHONE HYDROCHLORIDE 0.5 MILLIGRAM(S): 2 INJECTION INTRAMUSCULAR; INTRAVENOUS; SUBCUTANEOUS at 02:41

## 2019-10-02 RX ADMIN — HYDROMORPHONE HYDROCHLORIDE 0.5 MILLIGRAM(S): 2 INJECTION INTRAMUSCULAR; INTRAVENOUS; SUBCUTANEOUS at 11:25

## 2019-10-02 NOTE — PROGRESS NOTE ADULT - SUBJECTIVE AND OBJECTIVE BOX
cc: sepsis, care and comfort     interval hx: patient seen and eval. comfortable, minimally responsive, family at bedside ,        Vital Signs Last 24 Hrs  T(C): 36.3 (02 Oct 2019 12:32), Max: 36.3 (02 Oct 2019 12:32)  T(F): 97.4 (02 Oct 2019 12:32), Max: 97.4 (02 Oct 2019 12:32)  HR: 80 (02 Oct 2019 12:32) (80 - 80)  BP: 70/41 (02 Oct 2019 12:32) (70/41 - 70/41)  BP(mean): --  RR: 12 (02 Oct 2019 12:32) (12 - 12)  SpO2: 99% (02 Oct 2019 12:32) (99% - 99%)      gen: nonverbal , minimally responsive, breathing spontaneously   lungs: diminished at bases   cvs : s1,s2   ext: +1pitting edema   neuro: minimally responsive

## 2019-10-02 NOTE — PROGRESS NOTE ADULT - SUBJECTIVE AND OBJECTIVE BOX
CC:  follow  up symptoms  GOC   INTERVAL HPI/OVERNIGHT EVENTS:    PRESENT SYMPTOMS: SOURCE:  Patient/Family/Team    PAIN SCALE:  0 = none  1 = mild   2 = moderate  3 = severe    Pain: 0  Dyspnea:  [ ] YES [ x] NO  Anxiety:  [ ] YES [x ] NO  Fatigue: [x ] YES [ ] NO  Nausea: [ ] YES [x ] NO  Loss of Appetite: [ x] YES [ ] NO  Other symptoms: __________    MEDICATIONS  (STANDING):  HYDROmorphone  Injectable 0.5 milliGRAM(s) IV Push every 4 hours  LORazepam   Injectable 0.5 milliGRAM(s) IV Push every 6 hours    MEDICATIONS  (PRN):  glycopyrrolate Injectable 0.4 milliGRAM(s) IV Push every 6 hours PRN secretions  HYDROmorphone  Injectable 1 milliGRAM(s) IV Push every 2 hours PRN dyspnea  LORazepam   Injectable 1 milliGRAM(s) IV Push every 4 hours PRN respiratory distress/anxiety      Allergies    No Known Allergies    Intolerances    Karnofsky Performance Score/Palliative Performance Status Version 2: 10 %    Vital Signs Last 24 Hrs  T(C): 36.6 (01 Oct 2019 14:21), Max: 36.6 (01 Oct 2019 14:21)  T(F): 97.8 (01 Oct 2019 14:21), Max: 97.8 (01 Oct 2019 14:21)  HR: 68 (01 Oct 2019 14:21) (68 - 68)  BP: 62/38 (01 Oct 2019 14:21) (62/38 - 62/38)  BP(mean): --  RR: 28 (01 Oct 2019 14:21) (28 - 28)  SpO2: 98% (01 Oct 2019 14:21) (98% - 98%)    PHYSICAL EXAM:    General: Lethargic NAD  HEENT: [ x] normal  [ ] dry mouth  [ ] ET tube/trach    Lungs: [x ] comfortable [ ] tachypnea/labored breathing  [ ] excessive secretions    CV: [x ] normal  [ ] tachycardia    GI: [x ] normal  [ ] distended  [ ] tender  [ ] no BS               [ ] PEG/NG/OG tube    : [ ] normal  [ ] incontinent  [ x] oliguria/anuria  [ ] carcamo    MSK: [ ] normal  [x ] weakness  [ ] edema             [ ] ambulatory  [x ] bedbound/wheelchair bound    Skin: [ ] normal  [ ] pressure ulcers- Stage_____  [ x] no rash    LABS:                        8.6    34.90 )-----------( 105      ( 01 Oct 2019 06:18 )             26.0     10-01    135  |  99  |  26.0<H>  ----------------------------<  99  3.1<L>   |  12.0<L>  |  1.65<H>    Ca    7.7<L>      01 Oct 2019 06:18  Mg     1.2     10-01    TPro  5.4<L>  /  Alb  2.3<L>  /  TBili  7.5<H>  /  DBili  x   /  AST  554<H>  /  ALT  180<H>  /  AlkPhos  942<H>  10-01    PT/INR - ( 01 Oct 2019 06:18 )   PT: 21.5 sec;   INR: 1.83 ratio         PTT - ( 01 Oct 2019 06:18 )  PTT:35.3 sec    I&O's Summary    Thank you for the opportunity to assist with the care of this patient.   [place] [facility] [phone].

## 2019-10-03 ENCOUNTER — TRANSCRIPTION ENCOUNTER (OUTPATIENT)
Age: 79
End: 2019-10-03

## 2019-10-03 DIAGNOSIS — R06.00 DYSPNEA, UNSPECIFIED: ICD-10-CM

## 2019-10-03 LAB
-  AMIKACIN: SIGNIFICANT CHANGE UP
-  AMPICILLIN/SULBACTAM: SIGNIFICANT CHANGE UP
-  AMPICILLIN: SIGNIFICANT CHANGE UP
-  AZTREONAM: SIGNIFICANT CHANGE UP
-  CEFAZOLIN: SIGNIFICANT CHANGE UP
-  CEFEPIME: SIGNIFICANT CHANGE UP
-  CEFOXITIN: SIGNIFICANT CHANGE UP
-  CEFTRIAXONE: SIGNIFICANT CHANGE UP
-  CIPROFLOXACIN: SIGNIFICANT CHANGE UP
-  ERTAPENEM: SIGNIFICANT CHANGE UP
-  GENTAMICIN: SIGNIFICANT CHANGE UP
-  IMIPENEM: SIGNIFICANT CHANGE UP
-  LEVOFLOXACIN: SIGNIFICANT CHANGE UP
-  MEROPENEM: SIGNIFICANT CHANGE UP
-  PIPERACILLIN/TAZOBACTAM: SIGNIFICANT CHANGE UP
-  TOBRAMYCIN: SIGNIFICANT CHANGE UP
-  TRIMETHOPRIM/SULFAMETHOXAZOLE: SIGNIFICANT CHANGE UP
CULTURE RESULTS: SIGNIFICANT CHANGE UP
CULTURE RESULTS: SIGNIFICANT CHANGE UP
METHOD TYPE: SIGNIFICANT CHANGE UP
ORGANISM # SPEC MICROSCOPIC CNT: SIGNIFICANT CHANGE UP
SPECIMEN SOURCE: SIGNIFICANT CHANGE UP
SPECIMEN SOURCE: SIGNIFICANT CHANGE UP

## 2019-10-03 PROCEDURE — 99232 SBSQ HOSP IP/OBS MODERATE 35: CPT

## 2019-10-03 PROCEDURE — 99233 SBSQ HOSP IP/OBS HIGH 50: CPT

## 2019-10-03 PROCEDURE — 99239 HOSP IP/OBS DSCHRG MGMT >30: CPT

## 2019-10-03 RX ORDER — LECITHIN 1200 MG
1 CAPSULE ORAL
Qty: 0 | Refills: 0 | DISCHARGE

## 2019-10-03 RX ORDER — HYDROMORPHONE HYDROCHLORIDE 2 MG/ML
1 INJECTION INTRAMUSCULAR; INTRAVENOUS; SUBCUTANEOUS
Qty: 0 | Refills: 0 | DISCHARGE
Start: 2019-10-03

## 2019-10-03 RX ORDER — HYDROMORPHONE HYDROCHLORIDE 2 MG/ML
0.5 INJECTION INTRAMUSCULAR; INTRAVENOUS; SUBCUTANEOUS
Qty: 0 | Refills: 0 | DISCHARGE
Start: 2019-10-03

## 2019-10-03 RX ORDER — LISINOPRIL 2.5 MG/1
1 TABLET ORAL
Qty: 0 | Refills: 0 | DISCHARGE

## 2019-10-03 RX ORDER — CHOLECALCIFEROL (VITAMIN D3) 125 MCG
1 CAPSULE ORAL
Qty: 0 | Refills: 0 | DISCHARGE

## 2019-10-03 RX ORDER — DONEPEZIL HYDROCHLORIDE 10 MG/1
1 TABLET, FILM COATED ORAL
Qty: 0 | Refills: 0 | DISCHARGE

## 2019-10-03 RX ORDER — ROBINUL 0.2 MG/ML
0.4 INJECTION INTRAMUSCULAR; INTRAVENOUS
Qty: 0 | Refills: 0 | DISCHARGE
Start: 2019-10-03

## 2019-10-03 RX ORDER — VITAMIN E 100 UNIT
1 CAPSULE ORAL
Qty: 0 | Refills: 0 | DISCHARGE

## 2019-10-03 RX ADMIN — HYDROMORPHONE HYDROCHLORIDE 0.5 MILLIGRAM(S): 2 INJECTION INTRAMUSCULAR; INTRAVENOUS; SUBCUTANEOUS at 14:23

## 2019-10-03 RX ADMIN — HYDROMORPHONE HYDROCHLORIDE 0.5 MILLIGRAM(S): 2 INJECTION INTRAMUSCULAR; INTRAVENOUS; SUBCUTANEOUS at 10:36

## 2019-10-03 RX ADMIN — HYDROMORPHONE HYDROCHLORIDE 0.5 MILLIGRAM(S): 2 INJECTION INTRAMUSCULAR; INTRAVENOUS; SUBCUTANEOUS at 05:23

## 2019-10-03 RX ADMIN — Medication 0.5 MILLIGRAM(S): at 13:11

## 2019-10-03 RX ADMIN — Medication 0.5 MILLIGRAM(S): at 06:08

## 2019-10-03 RX ADMIN — HYDROMORPHONE HYDROCHLORIDE 0.5 MILLIGRAM(S): 2 INJECTION INTRAMUSCULAR; INTRAVENOUS; SUBCUTANEOUS at 01:07

## 2019-10-03 RX ADMIN — HYDROMORPHONE HYDROCHLORIDE 0.5 MILLIGRAM(S): 2 INJECTION INTRAMUSCULAR; INTRAVENOUS; SUBCUTANEOUS at 02:07

## 2019-10-03 RX ADMIN — HYDROMORPHONE HYDROCHLORIDE 0.5 MILLIGRAM(S): 2 INJECTION INTRAMUSCULAR; INTRAVENOUS; SUBCUTANEOUS at 10:51

## 2019-10-03 RX ADMIN — HYDROMORPHONE HYDROCHLORIDE 0.5 MILLIGRAM(S): 2 INJECTION INTRAMUSCULAR; INTRAVENOUS; SUBCUTANEOUS at 06:11

## 2019-10-03 RX ADMIN — Medication 0.5 MILLIGRAM(S): at 00:05

## 2019-10-03 NOTE — PROGRESS NOTE ADULT - PROBLEM SELECTOR PLAN 4
Events noted overnight. Spoke to nurse- brother has questions regarding patient's condition - low urine output, cardiac monitor, IV fluids.   Spoke to brother and sister in law and addressed  their concerns.   Continuing comfort care.   Informed family -prognosis hours to days.
-[facility]. Her body is warm and she appears stable for transfer. Hospice RN discussed with family and they have agreed for transfer. Patients sons en route from [place] and [facility] - one is arriving tomorrow morning and the other on Saturday.

## 2019-10-03 NOTE — DISCHARGE NOTE NURSING/CASE MANAGEMENT/SOCIAL WORK - PATIENT PORTAL LINK FT
You can access the FollowMyHealth Patient Portal offered by [facility] by registering at the following website: [email or web address]. By joining [employer]’s FollowMyHealth portal, you will also be able to view your health information using other applications (apps) compatible with our system.

## 2019-10-03 NOTE — DISCHARGE NOTE PROVIDER - NSDCCPCAREPLAN_GEN_ALL_CORE_FT
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

## 2019-10-03 NOTE — DISCHARGE NOTE PROVIDER - NSDCQMCOGNITION_NEU_ALL_CORE
Difficulty concentrating/Difficulty making decisions Difficulty concentrating/Difficulty making decisions/Difficulty remembering

## 2019-10-03 NOTE — PROGRESS NOTE ADULT - ATTENDING COMMENTS
Thank you for the opportunity to assist with the care of this patient.   [place] [facility] [phone].

## 2019-10-03 NOTE — PROGRESS NOTE ADULT - PROBLEM SELECTOR PLAN 1
Patient on comfort care. No further treatments.  Patient had decided to focus on her comfort.   AKASH in chart.
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 biopsy + poorly differentiated carcinoma. Patient and her family have opted to forego anti neoplastic treatment and focus on comfort. poor prognosis overall.

## 2019-10-03 NOTE — PROGRESS NOTE ADULT - SUBJECTIVE AND OBJECTIVE BOX
OVERNIGHT EVENTS:    BRIEF HOSPITAL COURSE:    Present Symptoms:     Dyspnea: 0 1 2 3   Nausea/Vomiting: Yes No  Anxiety:  Yes No  Depression: Yes No  Fatigue: Yes No  Loss of appetite: Yes No    Pain:             Character-            Duration-            Effect-            Factors-            Frequency-            Location-            Severity-    Review of Systems: Reviewed                     Negative:                     Positive:  Unable to obtain due to poor mentation   All others negative    MEDICATIONS  (STANDING):  HYDROmorphone  Injectable 0.5 milliGRAM(s) IV Push every 4 hours  LORazepam   Injectable 0.5 milliGRAM(s) IV Push every 6 hours    MEDICATIONS  (PRN):  glycopyrrolate Injectable 0.4 milliGRAM(s) IV Push every 6 hours PRN secretions  HYDROmorphone  Injectable 1 milliGRAM(s) IV Push every 2 hours PRN dyspnea  LORazepam   Injectable 1 milliGRAM(s) IV Push every 4 hours PRN respiratory distress/anxiety      PHYSICAL EXAM:    Vital Signs Last 24 Hrs  T(C): --  T(F): --  HR: --  BP: --  BP(mean): --  RR: --  SpO2: --    General: alert  oriented x ____ lethargic agitated                  cachexia  nonverbal  coma    Karnofsky:  %    HEENT: normal  dry mouth  ET tube/trach    Lungs: comfortable tachypnea/labored breathing  excessive secretions    CV: normal  tachycardia    GI: normal  distended  tender  no BS               PEG/NG/OG tube  constipation  last BM:     : normal  incontinent  oliguria/anuria  carcamo    MSK: normal  weakness  edema             ambulatory  bedbound/wheelchair bound    Skin: normal  pressure ulcers- Stage_____  no rash    LABS:    I&O's Summary    RADIOLOGY & ADDITIONAL STUDIES:    ADVANCE DIRECTIVES: OVERNIGHT EVENTS: at end of life, comfortable.     Present Symptoms:     Dyspnea: 0   Nausea/Vomiting: No  Anxiety:  No  Depression: unable   Fatigue: Yes   Loss of appetite: unable     Pain: none             Character-            Duration-            Effect-            Factors-            Frequency-            Location-            Severity-    Review of Systems: Reviewed                  Unable to obtain due to poor mentation   All others negative    MEDICATIONS  (STANDING):  HYDROmorphone  Injectable 0.5 milliGRAM(s) IV Push every 4 hours  LORazepam   Injectable 0.5 milliGRAM(s) IV Push every 6 hours    MEDICATIONS  (PRN):  glycopyrrolate Injectable 0.4 milliGRAM(s) IV Push every 6 hours PRN secretions  HYDROmorphone  Injectable 1 milliGRAM(s) IV Push every 2 hours PRN dyspnea  LORazepam   Injectable 1 milliGRAM(s) IV Push every 4 hours PRN respiratory distress/anxiety    PHYSICAL EXAM:    Vital Signs Last 24 Hrs  T(C): --  T(F): --  HR: --  BP: --  BP(mean): --  RR: --  SpO2: --    General: lethargic.     Karnofsky: 10 %    HEENT: normal      Lungs: comfortable    CV: normal      GI: normal     : oliguria/anuria     MSK: bedbound/wheelchair bound    Skin: no rash    LABS: nothing new     I&O's Summary    RADIOLOGY & ADDITIONAL STUDIES:    ADVANCE DIRECTIVES: DNR/I comfort care

## 2019-10-03 NOTE — DISCHARGE NOTE PROVIDER - HOSPITAL COURSE
78 y/o female initially taken to ER at [facility] for fever and confusion. Patient was in a septic shock and jaundiced and was resuscitated with 4 liters IVF and Abx. Patient was  transferred from [facility] for ERCP. 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 In the ER, patient was made comfort care by family.  Patient followed by Palliative and Hospice.  Patient's medications adjusted to comfort.  [facility]. 80 y/o female initially taken to ER at [facility] for fever and confusion. Patient was in a septic shock and jaundiced and was resuscitated with 4 liters IVF and Abx. Patient was  transferred from [facility] for ERCP. 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 In the ER, patient was made comfort care by family.  Patient followed by Palliative and Hospice.  Patient's medications adjusted to comfort.  [facility].         time spent for this dc 35 mins

## 2019-10-03 NOTE — PROGRESS NOTE ADULT - ASSESSMENT
79yr woman  recent diagnosis of hepatobiliary cancer, admitted with  septic shock 2/2  cholangitis with kidney failure. Patient on comfort care. Prognosis hours/days
80 y/o female initially taken to ER at [facility] for fever and confusion. patient was in a septic shock and jaundiced and was resuscitated with 4 liters IVF and Abx. Patient was  transferred from [facility] for ERCP. 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 in the ER, patient was  made for comfort care by family .     -c/w comfort measures   -dc iv abxs   -adjust meds for comfort   -palliative on board.
79F just diagnosed with hepatobiliary cancer, admitted here for septic shock related to cholangitis, kidney failure, now on comfort care.

## 2019-10-06 LAB
CULTURE RESULTS: SIGNIFICANT CHANGE UP
CULTURE RESULTS: SIGNIFICANT CHANGE UP
SPECIMEN SOURCE: SIGNIFICANT CHANGE UP
SPECIMEN SOURCE: SIGNIFICANT CHANGE UP

## 2019-11-12 PROCEDURE — 85610 PROTHROMBIN TIME: CPT

## 2019-11-12 PROCEDURE — 80053 COMPREHEN METABOLIC PANEL: CPT

## 2019-11-12 PROCEDURE — 83605 ASSAY OF LACTIC ACID: CPT

## 2019-11-12 PROCEDURE — 94760 N-INVAS EAR/PLS OXIMETRY 1: CPT

## 2019-11-12 PROCEDURE — 36415 COLL VENOUS BLD VENIPUNCTURE: CPT

## 2019-11-12 PROCEDURE — 86850 RBC ANTIBODY SCREEN: CPT

## 2019-11-12 PROCEDURE — 83690 ASSAY OF LIPASE: CPT

## 2019-11-12 PROCEDURE — 86901 BLOOD TYPING SEROLOGIC RH(D): CPT

## 2019-11-12 PROCEDURE — 85027 COMPLETE CBC AUTOMATED: CPT

## 2019-11-12 PROCEDURE — 85730 THROMBOPLASTIN TIME PARTIAL: CPT

## 2019-11-12 PROCEDURE — 86900 BLOOD TYPING SEROLOGIC ABO: CPT

## 2019-11-12 PROCEDURE — T1013: CPT

## 2019-11-12 PROCEDURE — 83735 ASSAY OF MAGNESIUM: CPT

## 2019-11-12 PROCEDURE — 87040 BLOOD CULTURE FOR BACTERIA: CPT

## 2020-01-14 NOTE — DISCHARGE NOTE PROVIDER - NSDCQMSTROKE_NEU_ALL_CORE
[FreeTextEntry1] : The patient is a 42-year-old woman from [facility] who was seen in the office today for the above. She said that the symptoms started about one month ago. Her usual daytime frequency was 5 times a day and normally she does not have any of these urinary symptoms.\par \par Past Urological History: A few UTIs\par \par Urological Family History: Negative No

## 2024-04-08 NOTE — CONSULT NOTE ADULT - CONSULT REASON
Sherley is a 37 year old year old  at 20w3d who presents for routine OB care.     Setswana  # [id number] Sahar used entire visit     She denies contractions/cramping, loss of fluid, vaginal bleeding. Reports rare fetal movement.     Other concerns: refilled zofran for nausea and vomiting. Now gaining weight normally.      Has some shooting pain in her legs, also has back pain-likely sciatica, offered PT referral to help with possible sciatica. She opts to do some home exercises.     She notes increased vaginal discharge-reports it is creamy, sometimes more thick, this happens only after intercourse. She occasionally has burning with urination only after intercourse and denies it now.  She denies burning or pain in the vagina. She has been tested for BV and it was negative 2/15/24. Likely physiologic discharge. Recommend voiding after intercourse and cleaning with only warm water.     Problems:   1. Supervision of normal first pregnancy, antepartum  1. Single living fetus with a gestational age of 20w 3d based on the reported clinical dates.  2. Current growth parameters are consistent with the clinical date of 20w 3d, indicating normal  fetal growth. Composite age based on the current ultrasound alone is 21w 1d (US CHARMAINE  2024).  3. Normal fetal anatomic survey. Detailed fetal evaluation was performed and no structural  abnormalities are noted.  4. Placenta: Anterior No Previa  5. Amniotic fluid Normal.  6. Normal transvaginal appearance of the cervix.    Discussed normal anatomy today, follow-up as clinically indicated    Cell free DNA negative         2. Fibroid  Small, not mentioned on US today     OB labs and vitals reviewed.     Precautions discussed and addressed.     Return in 4 weeks    Katya Carson MD  Obstetrician/Gynecologist          Liver [facility]